# Patient Record
Sex: FEMALE | Race: BLACK OR AFRICAN AMERICAN | NOT HISPANIC OR LATINO | Employment: OTHER
[De-identification: names, ages, dates, MRNs, and addresses within clinical notes are randomized per-mention and may not be internally consistent; named-entity substitution may affect disease eponyms.]

---

## 2016-08-30 LAB — COLONOSCOPY STUDY: NORMAL

## 2017-02-07 ENCOUNTER — CHARTING TRANS (OUTPATIENT)
Dept: OTHER | Age: 62
End: 2017-02-07

## 2017-02-07 ASSESSMENT — PAIN SCALES - GENERAL: PAINLEVEL_OUTOF10: 6

## 2017-03-10 ENCOUNTER — LAB SERVICES (OUTPATIENT)
Dept: OTHER | Age: 62
End: 2017-03-10

## 2017-03-10 ENCOUNTER — CHARTING TRANS (OUTPATIENT)
Dept: OTHER | Age: 62
End: 2017-03-10

## 2017-03-10 ASSESSMENT — PAIN SCALES - GENERAL: PAINLEVEL_OUTOF10: 0

## 2017-03-11 LAB
HEMATOCRIT: 38.4
HEMOGLOBIN: 12.2
LYM#: 1
LYM%: 48.8
MCH: 28.1
MCHC: 31.8
MCV: 88.5
MPV: 9.4
MXD#: 0.1
MXD%: 7.3
NEUT#: 0.9
NEUT%: 43.9
PLATELET: 189
RBC: 4.34
RDW-CV: 13.2
RDW-SD: 42.2
WBC: 2

## 2017-03-18 ENCOUNTER — CHARTING TRANS (OUTPATIENT)
Dept: OTHER | Age: 62
End: 2017-03-18

## 2017-03-18 LAB
25(OH)D3+25(OH)D2 SERPL-MCNC: 63.2 NG/ML (ref 30–100)
CHOLEST SERPL-MCNC: 240 MG/DL
CHOLEST/HDLC SERPL: 2.9
FOLATE SERPL-MCNC: >24 NG/ML
HDLC SERPL-MCNC: 83 MG/DL
LDLC SERPL CALC-MCNC: 150 MG/DL
LENGTH OF FAST TIME PATIENT: ABNORMAL HRS
NONHDLC SERPL-MCNC: 157 MG/DL
T4 FREE SERPL-MCNC: 1 NG/DL (ref 0.8–1.5)
TRIGL SERPL-MCNC: 36 MG/DL
TSH SERPL-ACNC: 5.6 MCUNITS/ML (ref 0.35–5)
VIT B12 SERPL-MCNC: 562 PG/ML (ref 211–911)

## 2017-04-05 ENCOUNTER — CHARTING TRANS (OUTPATIENT)
Dept: OTHER | Age: 62
End: 2017-04-05

## 2017-04-05 ASSESSMENT — PAIN SCALES - GENERAL: PAINLEVEL_OUTOF10: 0

## 2017-07-14 ENCOUNTER — CHARTING TRANS (OUTPATIENT)
Dept: OTHER | Age: 62
End: 2017-07-14

## 2017-07-14 ASSESSMENT — PAIN SCALES - GENERAL: PAINLEVEL_OUTOF10: 0

## 2017-08-01 ENCOUNTER — LAB SERVICES (OUTPATIENT)
Dept: OTHER | Age: 62
End: 2017-08-01

## 2017-08-03 LAB
HEMATOCRIT: 35
HEMOGLOBIN: 11.5
LYM#: 1
LYM%: 44.8
MCH: 28.5
MCHC: 32.9
MCV: 86.6
MPV: 9.8
MXD#: 0.1
MXD%: 5.8
NEUT#: 1.2
NEUT%: 49.4
PLATELET: 184
RBC: 4.04
RDW-CV: 13.3
RDW-SD: 41.7
WBC: 2.3

## 2017-08-06 ENCOUNTER — CHARTING TRANS (OUTPATIENT)
Dept: OTHER | Age: 62
End: 2017-08-06

## 2017-08-06 LAB
ALBUMIN SERPL-MCNC: 3.6 G/DL (ref 3.6–5.1)
ALBUMIN/GLOB SERPL: 1.2 (ref 1–2.4)
ALP SERPL-CCNC: 82 UNITS/L (ref 45–117)
ALT SERPL-CCNC: 23 UNITS/L
ANION GAP SERPL CALC-SCNC: 11 MMOL/L (ref 10–20)
AST SERPL-CCNC: 17 UNITS/L
BILIRUB SERPL-MCNC: 0.4 MG/DL (ref 0.2–1)
BUN SERPL-MCNC: 20 MG/DL (ref 6–20)
BUN/CREAT SERPL: 31 (ref 7–25)
CALCIUM SERPL-MCNC: 9 MG/DL (ref 8.4–10.2)
CHLORIDE SERPL-SCNC: 105 MMOL/L (ref 98–107)
CHOLEST SERPL-MCNC: 203 MG/DL
CHOLEST/HDLC SERPL: 2.5
CO2 SERPL-SCNC: 28 MMOL/L (ref 21–32)
CREAT SERPL-MCNC: 0.65 MG/DL (ref 0.51–0.95)
FOLATE SERPL-MCNC: >24 NG/ML
GLOBULIN SER-MCNC: 3.1 G/DL (ref 2–4)
GLUCOSE SERPL-MCNC: 81 MG/DL (ref 65–99)
HDLC SERPL-MCNC: 81 MG/DL
LDLC SERPL CALC-MCNC: 114 MG/DL
LENGTH OF FAST TIME PATIENT: ABNORMAL HRS
LENGTH OF FAST TIME PATIENT: ABNORMAL HRS
NONHDLC SERPL-MCNC: 122 MG/DL
POTASSIUM SERPL-SCNC: 4 MMOL/L (ref 3.4–5.1)
SODIUM SERPL-SCNC: 140 MMOL/L (ref 135–145)
T4 FREE SERPL-MCNC: 0.9 NG/DL (ref 0.8–1.5)
TOTAL PROTEIN: 6.7 G/DL (ref 6.4–8.2)
TRIGL SERPL-MCNC: 39 MG/DL
TSH SERPL-ACNC: 4.23 MCUNITS/ML (ref 0.35–5)
VIT B12 SERPL-MCNC: 604 PG/ML (ref 211–911)

## 2017-08-25 ENCOUNTER — CHARTING TRANS (OUTPATIENT)
Dept: OTHER | Age: 62
End: 2017-08-25

## 2017-08-25 ENCOUNTER — LAB SERVICES (OUTPATIENT)
Dept: OTHER | Age: 62
End: 2017-08-25

## 2017-08-25 ENCOUNTER — HOSPITAL (OUTPATIENT)
Dept: OTHER | Age: 62
End: 2017-08-25
Attending: INTERNAL MEDICINE

## 2017-08-25 ENCOUNTER — IMAGING SERVICES (OUTPATIENT)
Dept: OTHER | Age: 62
End: 2017-08-25

## 2017-08-25 ASSESSMENT — PAIN SCALES - GENERAL: PAINLEVEL_OUTOF10: 0

## 2017-08-30 ENCOUNTER — CHARTING TRANS (OUTPATIENT)
Dept: OTHER | Age: 62
End: 2017-08-30

## 2017-08-30 LAB — D-DIMER, QUANTITATIVE: 0.24 MCG/ML FEU

## 2017-08-30 ASSESSMENT — PAIN SCALES - GENERAL: PAINLEVEL_OUTOF10: 0

## 2017-10-16 ENCOUNTER — LAB SERVICES (OUTPATIENT)
Dept: OTHER | Age: 62
End: 2017-10-16

## 2017-10-16 ENCOUNTER — CHARTING TRANS (OUTPATIENT)
Dept: OTHER | Age: 62
End: 2017-10-16

## 2017-10-16 LAB
HEMATOCRIT: 38.1
HEMOGLOBIN: 12
LYM#: 1
LYM%: 39.8
MCH: 28.6
MCHC: 31.5
MCV: 90.9
MPV: 9.5
MXD#: 0.1
MXD%: 6.1
NEUT#: 1.3
NEUT%: 54.1
PLATELET: 203
RBC: 4.19
RDW-CV: 13.2
RDW-SD: 43.5
WBC: 2.4

## 2017-10-16 ASSESSMENT — PAIN SCALES - GENERAL: PAINLEVEL_OUTOF10: 0

## 2017-10-17 LAB
BASOPHILS # BLD: 0 K/MCL (ref 0–0.3)
BASOPHILS NFR BLD: 0 %
D DIMER PPP FEU-MCNC: 0.38 MG/L (FEU)
DIFFERENTIAL METHOD BLD: ABNORMAL
EOSINOPHIL # BLD: 0 K/MCL (ref 0.1–0.5)
EOSINOPHIL NFR BLD: 0 %
ERYTHROCYTE [DISTWIDTH] IN BLOOD: 13.6 % (ref 11–15)
HEMATOCRIT: 36.9 % (ref 36–46.5)
HEMOGLOBIN: 11.7 G/DL (ref 12–15.5)
LYMPHOCYTES # BLD: 1 K/MCL (ref 1–4)
LYMPHOCYTES NFR BLD: 40 %
MEAN CORPUSCULAR HEMOGLOBIN: 28.3 PG (ref 26–34)
MEAN CORPUSCULAR HGB CONC: 31.7 G/DL (ref 32–36.5)
MEAN CORPUSCULAR VOLUME: 89.3 FL (ref 78–100)
MONOCYTES # BLD: 0.2 K/MCL (ref 0.3–0.9)
MONOCYTES NFR BLD: 9 %
NEUTROPHILS # BLD: 1.3 K/MCL (ref 1.8–7.7)
NEUTROPHILS NFR BLD: 51 %
PLATELET COUNT: 211 K/MCL (ref 140–450)
RED CELL COUNT: 4.13 MIL/MCL (ref 4–5.2)
WHITE BLOOD COUNT: 2.5 K/MCL (ref 4.2–11)

## 2017-10-19 LAB
B BURGDOR IGG PATRN SER IB-IMP: NORMAL
B BURGDOR IGG SER QL IB: NEGATIVE
B BURGDOR IGG+IGM SER IB-IMP: NORMAL
B BURGDOR IGM PATRN SER IB-IMP: NORMAL
B BURGDOR IGM SER QL IB: NEGATIVE

## 2017-10-31 ENCOUNTER — CHARTING TRANS (OUTPATIENT)
Dept: OTHER | Age: 62
End: 2017-10-31

## 2017-11-01 ENCOUNTER — CHARTING TRANS (OUTPATIENT)
Dept: OTHER | Age: 62
End: 2017-11-01

## 2018-02-19 ENCOUNTER — LAB SERVICES (OUTPATIENT)
Dept: OTHER | Age: 63
End: 2018-02-19

## 2018-02-19 ENCOUNTER — CHARTING TRANS (OUTPATIENT)
Dept: OTHER | Age: 63
End: 2018-02-19

## 2018-02-19 ASSESSMENT — PAIN SCALES - GENERAL: PAINLEVEL_OUTOF10: 0

## 2018-02-20 LAB
HEMATOCRIT: 40
HEMOGLOBIN: 12.5
LYM#: 1
LYM%: 50.5
MCH: 28.4
MCHC: 31.3
MCV: 90.9
MPV: 9
MXD#: 0.2
MXD%: 9.6
NEUT#: 0.8
NEUT%: 39.9
PLATELET: 197
RBC: 4.4
RDW-CV: 13.1
RDW-SD: 42.9
WBC: 2

## 2018-02-26 ENCOUNTER — CHARTING TRANS (OUTPATIENT)
Dept: OTHER | Age: 63
End: 2018-02-26

## 2018-02-26 LAB
25(OH)D3+25(OH)D2 SERPL-MCNC: 59.2 NG/ML (ref 30–100)
HBV SURFACE AB SER QL: POSITIVE
HBV SURFACE AG SER QL: NEGATIVE
HCV AB SER QL: NEGATIVE
T4 FREE SERPL-MCNC: 1.1 NG/DL (ref 0.8–1.5)
TSH SERPL-ACNC: 1.74 MCUNITS/ML (ref 0.35–5)

## 2018-03-15 ENCOUNTER — CHARTING TRANS (OUTPATIENT)
Dept: OTHER | Age: 63
End: 2018-03-15

## 2018-03-15 ASSESSMENT — PAIN SCALES - GENERAL: PAINLEVEL_OUTOF10: 0

## 2018-05-14 ENCOUNTER — IMAGING SERVICES (OUTPATIENT)
Dept: OTHER | Age: 63
End: 2018-05-14

## 2018-05-14 ENCOUNTER — CHARTING TRANS (OUTPATIENT)
Dept: OTHER | Age: 63
End: 2018-05-14

## 2018-05-14 ENCOUNTER — LAB SERVICES (OUTPATIENT)
Dept: OTHER | Age: 63
End: 2018-05-14

## 2018-05-14 ASSESSMENT — PAIN SCALES - GENERAL: PAINLEVEL_OUTOF10: 0

## 2018-05-15 LAB
HEMATOCRIT: 38.5
HEMOGLOBIN: 12.6
LYM#: 1.1
LYM%: 41.4
MCH: 28.6
MCHC: 32.7
MCV: 87.5
MPV: 9.7
MXD#: 0.2
MXD%: 8.2
NEUT#: 1.4
NEUT%: 50.4
PLATELET: 196
RBC: 4.4
RDW-CV: 13.3
RDW-SD: 42.6
WBC: 2.7

## 2018-08-22 ENCOUNTER — LAB SERVICES (OUTPATIENT)
Dept: OTHER | Age: 63
End: 2018-08-22

## 2018-08-22 ENCOUNTER — CHARTING TRANS (OUTPATIENT)
Dept: OTHER | Age: 63
End: 2018-08-22

## 2018-08-22 LAB
HEMATOCRIT: 36.7
HEMOGLOBIN: 12
LYM#: 1
LYM%: 44.7
MCH: 28.4
MCHC: 32.7
MCV: 87
MPV: 9.1
MXD#: 0.2
MXD%: 10
NEUT#: 1
NEUT%: 45.3
PLATELET: 209
RBC: 4.22
RDW-CV: 13.8
RDW-SD: 43.5
WBC: - 2.2

## 2018-08-30 ENCOUNTER — LAB SERVICES (OUTPATIENT)
Dept: OTHER | Age: 63
End: 2018-08-30

## 2018-09-03 ENCOUNTER — CHARTING TRANS (OUTPATIENT)
Dept: OTHER | Age: 63
End: 2018-09-03

## 2018-09-03 LAB
25(OH)D3+25(OH)D2 SERPL-MCNC: 39.1 NG/ML (ref 30–100)
ALBUMIN SERPL-MCNC: 3.6 G/DL (ref 3.6–5.1)
ALBUMIN/GLOB SERPL: 1.2 (ref 1–2.4)
ALP SERPL-CCNC: 97 UNITS/L (ref 45–117)
ALT SERPL-CCNC: 20 UNITS/L
ANION GAP SERPL CALC-SCNC: 14 MMOL/L (ref 10–20)
APPEARANCE UR: ABNORMAL
AST SERPL-CCNC: 16 UNITS/L
BACTERIA #/AREA URNS HPF: ABNORMAL /HPF
BILIRUB SERPL-MCNC: 0.3 MG/DL (ref 0.2–1)
BILIRUB UR QL: NEGATIVE
BUN SERPL-MCNC: 27 MG/DL (ref 6–20)
BUN/CREAT SERPL: 40 (ref 7–25)
CALCIUM SERPL-MCNC: 9.4 MG/DL (ref 8.4–10.2)
CHLORIDE SERPL-SCNC: 108 MMOL/L (ref 98–107)
CHOLEST SERPL-MCNC: 204 MG/DL
CHOLEST/HDLC SERPL: 2.8
CO2 SERPL-SCNC: 25 MMOL/L (ref 21–32)
COLOR UR: ABNORMAL
CREAT SERPL-MCNC: 0.68 MG/DL (ref 0.51–0.95)
GLOBULIN SER-MCNC: 3 G/DL (ref 2–4)
GLUCOSE SERPL-MCNC: 105 MG/DL (ref 65–99)
GLUCOSE UR-MCNC: NEGATIVE MG/DL
HDLC SERPL-MCNC: 72 MG/DL
HYALINE CASTS #/AREA URNS LPF: ABNORMAL /LPF (ref 0–5)
KETONES UR-MCNC: NEGATIVE MG/DL
LDLC SERPL CALC-MCNC: 123 MG/DL
LENGTH OF FAST TIME PATIENT: 12 HRS
LENGTH OF FAST TIME PATIENT: 12 HRS
MUCOUS THREADS URNS QL MICRO: PRESENT
NITRITE UR QL: NEGATIVE
NONHDLC SERPL-MCNC: 132 MG/DL
PH UR: 5 UNITS (ref 5–7)
POTASSIUM SERPL-SCNC: 4.3 MMOL/L (ref 3.4–5.1)
PROT UR QL: NEGATIVE MG/DL
RBC #/AREA URNS HPF: ABNORMAL /HPF (ref 0–3)
RBC-URINE: NEGATIVE
SODIUM SERPL-SCNC: 143 MMOL/L (ref 135–145)
SP GR UR: 1.02 (ref 1–1.03)
SPECIMEN SOURCE: ABNORMAL
SQUAMOUS #/AREA URNS HPF: ABNORMAL /HPF (ref 0–5)
T4 FREE SERPL-MCNC: 1 NG/DL (ref 0.8–1.5)
TOTAL PROTEIN: 6.6 G/DL (ref 6.4–8.2)
TRIGL SERPL-MCNC: 47 MG/DL
TSH SERPL-ACNC: 1.11 MCUNITS/ML (ref 0.35–5)
UROBILINOGEN UR QL: 0.2 MG/DL (ref 0–1)
WBC #/AREA URNS HPF: ABNORMAL /HPF (ref 0–5)
WBC-URINE: NEGATIVE

## 2018-09-17 ENCOUNTER — CHARTING TRANS (OUTPATIENT)
Dept: OTHER | Age: 63
End: 2018-09-17

## 2018-09-25 ENCOUNTER — HOSPITAL (OUTPATIENT)
Dept: OTHER | Age: 63
End: 2018-09-25

## 2018-09-25 ENCOUNTER — IMAGING SERVICES (OUTPATIENT)
Dept: OTHER | Age: 63
End: 2018-09-25

## 2018-09-30 ENCOUNTER — CHARTING TRANS (OUTPATIENT)
Dept: OTHER | Age: 63
End: 2018-09-30

## 2018-11-01 VITALS
BODY MASS INDEX: 28.41 KG/M2 | OXYGEN SATURATION: 98 % | DIASTOLIC BLOOD PRESSURE: 74 MMHG | SYSTOLIC BLOOD PRESSURE: 109 MMHG | HEIGHT: 67 IN | RESPIRATION RATE: 16 BRPM | WEIGHT: 181 LBS | TEMPERATURE: 98.2 F | HEART RATE: 67 BPM

## 2018-11-01 VITALS
RESPIRATION RATE: 16 BRPM | HEART RATE: 71 BPM | HEIGHT: 68 IN | TEMPERATURE: 98.4 F | SYSTOLIC BLOOD PRESSURE: 98 MMHG | BODY MASS INDEX: 28.19 KG/M2 | WEIGHT: 186 LBS | DIASTOLIC BLOOD PRESSURE: 68 MMHG | OXYGEN SATURATION: 100 %

## 2018-11-01 VITALS
BODY MASS INDEX: 29.58 KG/M2 | HEIGHT: 67 IN | RESPIRATION RATE: 17 BRPM | SYSTOLIC BLOOD PRESSURE: 106 MMHG | DIASTOLIC BLOOD PRESSURE: 70 MMHG | HEART RATE: 78 BPM | WEIGHT: 188.47 LBS | TEMPERATURE: 97.4 F

## 2018-11-01 VITALS — HEIGHT: 67 IN | BODY MASS INDEX: 29.51 KG/M2 | WEIGHT: 188 LBS

## 2018-11-02 VITALS
HEART RATE: 59 BPM | OXYGEN SATURATION: 100 % | SYSTOLIC BLOOD PRESSURE: 113 MMHG | WEIGHT: 183 LBS | TEMPERATURE: 97.4 F | DIASTOLIC BLOOD PRESSURE: 71 MMHG | RESPIRATION RATE: 16 BRPM | BODY MASS INDEX: 28.72 KG/M2 | HEIGHT: 67 IN

## 2018-11-02 VITALS — HEIGHT: 67 IN | TEMPERATURE: 97.3 F | WEIGHT: 183.21 LBS | BODY MASS INDEX: 28.75 KG/M2

## 2018-11-02 VITALS
WEIGHT: 186.29 LBS | HEART RATE: 65 BPM | HEIGHT: 67 IN | SYSTOLIC BLOOD PRESSURE: 104 MMHG | DIASTOLIC BLOOD PRESSURE: 70 MMHG | BODY MASS INDEX: 29.24 KG/M2

## 2018-11-03 VITALS
HEIGHT: 67 IN | SYSTOLIC BLOOD PRESSURE: 110 MMHG | BODY MASS INDEX: 28.48 KG/M2 | WEIGHT: 181.44 LBS | WEIGHT: 181 LBS | RESPIRATION RATE: 20 BRPM | HEIGHT: 67 IN | HEART RATE: 63 BPM | BODY MASS INDEX: 28.41 KG/M2 | DIASTOLIC BLOOD PRESSURE: 76 MMHG | OXYGEN SATURATION: 100 % | TEMPERATURE: 97.6 F

## 2018-11-03 VITALS
BODY MASS INDEX: 28.25 KG/M2 | HEART RATE: 55 BPM | SYSTOLIC BLOOD PRESSURE: 110 MMHG | TEMPERATURE: 97.5 F | WEIGHT: 180 LBS | RESPIRATION RATE: 20 BRPM | DIASTOLIC BLOOD PRESSURE: 75 MMHG | HEIGHT: 67 IN | OXYGEN SATURATION: 99 %

## 2018-11-03 VITALS
TEMPERATURE: 98.2 F | SYSTOLIC BLOOD PRESSURE: 95 MMHG | WEIGHT: 179.66 LBS | BODY MASS INDEX: 28.2 KG/M2 | HEART RATE: 65 BPM | DIASTOLIC BLOOD PRESSURE: 69 MMHG | HEIGHT: 67 IN | RESPIRATION RATE: 18 BRPM

## 2018-11-05 VITALS
TEMPERATURE: 97.7 F | DIASTOLIC BLOOD PRESSURE: 72 MMHG | RESPIRATION RATE: 20 BRPM | OXYGEN SATURATION: 99 % | WEIGHT: 176 LBS | SYSTOLIC BLOOD PRESSURE: 106 MMHG | BODY MASS INDEX: 27.62 KG/M2 | HEIGHT: 67 IN | HEART RATE: 61 BPM

## 2018-11-05 VITALS
DIASTOLIC BLOOD PRESSURE: 69 MMHG | BODY MASS INDEX: 27.85 KG/M2 | WEIGHT: 177.47 LBS | HEART RATE: 68 BPM | RESPIRATION RATE: 18 BRPM | TEMPERATURE: 97.9 F | SYSTOLIC BLOOD PRESSURE: 103 MMHG | HEIGHT: 67 IN

## 2018-11-05 VITALS
HEIGHT: 67 IN | RESPIRATION RATE: 20 BRPM | WEIGHT: 177.58 LBS | TEMPERATURE: 97.7 F | DIASTOLIC BLOOD PRESSURE: 72 MMHG | BODY MASS INDEX: 27.87 KG/M2 | HEART RATE: 59 BPM | SYSTOLIC BLOOD PRESSURE: 109 MMHG

## 2018-11-27 VITALS
BODY MASS INDEX: 28.09 KG/M2 | SYSTOLIC BLOOD PRESSURE: 108 MMHG | HEART RATE: 72 BPM | HEIGHT: 67 IN | DIASTOLIC BLOOD PRESSURE: 69 MMHG | TEMPERATURE: 97.8 F | OXYGEN SATURATION: 99 % | WEIGHT: 179 LBS | RESPIRATION RATE: 18 BRPM

## 2018-11-27 VITALS
HEART RATE: 78 BPM | WEIGHT: 185.96 LBS | RESPIRATION RATE: 18 BRPM | TEMPERATURE: 98.2 F | HEIGHT: 67 IN | BODY MASS INDEX: 29.19 KG/M2

## 2018-12-04 ENCOUNTER — TELEPHONE (OUTPATIENT)
Dept: SCHEDULING | Age: 63
End: 2018-12-04

## 2018-12-05 ENCOUNTER — TELEPHONE (OUTPATIENT)
Dept: HEMATOLOGY/ONCOLOGY | Age: 63
End: 2018-12-05

## 2018-12-06 RX ORDER — LEVOTHYROXINE SODIUM 0.1 MG/1
TABLET ORAL
COMMUNITY
End: 2019-02-06 | Stop reason: SDUPTHER

## 2018-12-06 RX ORDER — ALENDRONATE SODIUM 70 MG/1
TABLET ORAL
COMMUNITY
Start: 2018-09-30 | End: 2019-02-06 | Stop reason: SDUPTHER

## 2018-12-24 ENCOUNTER — TELEPHONE (OUTPATIENT)
Dept: SCHEDULING | Age: 63
End: 2018-12-24

## 2019-01-03 DIAGNOSIS — Z12.31 VISIT FOR SCREENING MAMMOGRAM: Primary | ICD-10-CM

## 2019-01-10 DIAGNOSIS — M85.80 OSTEOPENIA, UNSPECIFIED LOCATION: ICD-10-CM

## 2019-01-10 DIAGNOSIS — D70.9 NEUTROPENIA, UNSPECIFIED TYPE (CMD): ICD-10-CM

## 2019-01-10 DIAGNOSIS — E55.9 VITAMIN D DEFICIENCY: ICD-10-CM

## 2019-01-10 DIAGNOSIS — E78.5 HYPERLIPIDEMIA, UNSPECIFIED HYPERLIPIDEMIA TYPE: ICD-10-CM

## 2019-01-10 DIAGNOSIS — R73.01 IFG (IMPAIRED FASTING GLUCOSE): ICD-10-CM

## 2019-01-10 DIAGNOSIS — D46.9 MYELODYSPLASTIC SYNDROME (CMD): ICD-10-CM

## 2019-01-10 DIAGNOSIS — E03.9 HYPOTHYROIDISM, UNSPECIFIED TYPE: Primary | ICD-10-CM

## 2019-02-06 ENCOUNTER — TELEPHONE (OUTPATIENT)
Dept: SCHEDULING | Age: 64
End: 2019-02-06

## 2019-02-06 ENCOUNTER — APPOINTMENT (OUTPATIENT)
Dept: LAB | Age: 64
End: 2019-02-06

## 2019-02-06 ENCOUNTER — OFFICE VISIT (OUTPATIENT)
Dept: ENDOCRINOLOGY | Age: 64
End: 2019-02-06

## 2019-02-06 ENCOUNTER — OFFICE VISIT (OUTPATIENT)
Dept: HEMATOLOGY/ONCOLOGY | Age: 64
End: 2019-02-06

## 2019-02-06 VITALS
DIASTOLIC BLOOD PRESSURE: 66 MMHG | WEIGHT: 187.83 LBS | HEIGHT: 67 IN | HEART RATE: 71 BPM | SYSTOLIC BLOOD PRESSURE: 106 MMHG | BODY MASS INDEX: 29.48 KG/M2

## 2019-02-06 DIAGNOSIS — Z00.00 HEALTHCARE MAINTENANCE: ICD-10-CM

## 2019-02-06 DIAGNOSIS — F20.9 SCHIZOPHRENIA, UNSPECIFIED TYPE (CMD): ICD-10-CM

## 2019-02-06 DIAGNOSIS — D70.8 OTHER NEUTROPENIA (CMD): Primary | ICD-10-CM

## 2019-02-06 DIAGNOSIS — M81.0 AGE-RELATED OSTEOPOROSIS WITHOUT CURRENT PATHOLOGICAL FRACTURE: ICD-10-CM

## 2019-02-06 DIAGNOSIS — E03.9 ACQUIRED HYPOTHYROIDISM: Primary | ICD-10-CM

## 2019-02-06 PROCEDURE — 36415 COLL VENOUS BLD VENIPUNCTURE: CPT | Performed by: INTERNAL MEDICINE

## 2019-02-06 PROCEDURE — 85025 COMPLETE CBC W/AUTO DIFF WBC: CPT | Performed by: INTERNAL MEDICINE

## 2019-02-06 PROCEDURE — 99214 OFFICE O/P EST MOD 30 MIN: CPT | Performed by: INTERNAL MEDICINE

## 2019-02-06 PROCEDURE — 99213 OFFICE O/P EST LOW 20 MIN: CPT | Performed by: INTERNAL MEDICINE

## 2019-02-06 RX ORDER — LEVOTHYROXINE SODIUM 0.1 MG/1
100 TABLET ORAL DAILY
Qty: 90 TABLET | Refills: 3 | Status: SHIPPED | OUTPATIENT
Start: 2019-02-06 | End: 2019-08-06 | Stop reason: SDUPTHER

## 2019-02-06 RX ORDER — ALENDRONATE SODIUM 70 MG/1
70 TABLET ORAL
Qty: 90 TABLET | Refills: 3 | Status: SHIPPED | OUTPATIENT
Start: 2019-02-06 | End: 2022-03-16 | Stop reason: SDUPTHER

## 2019-02-06 RX ORDER — RISPERIDONE 2 MG/1
2 TABLET ORAL AT BEDTIME
COMMUNITY
End: 2022-07-12 | Stop reason: DRUGHIGH

## 2019-02-06 ASSESSMENT — ENCOUNTER SYMPTOMS
GASTROINTESTINAL NEGATIVE: 1
CONSTITUTIONAL NEGATIVE: 1
EYES NEGATIVE: 1
NEUROLOGICAL NEGATIVE: 1
RESPIRATORY NEGATIVE: 1
PSYCHIATRIC NEGATIVE: 1

## 2019-02-06 ASSESSMENT — PAIN SCALES - GENERAL: PAINLEVEL: 0

## 2019-03-12 ENCOUNTER — APPOINTMENT (OUTPATIENT)
Dept: LAB | Age: 64
End: 2019-03-12

## 2019-03-12 ENCOUNTER — OFFICE VISIT (OUTPATIENT)
Dept: DERMATOLOGY | Age: 64
End: 2019-03-12

## 2019-03-12 VITALS — RESPIRATION RATE: 18 BRPM | HEIGHT: 67 IN | WEIGHT: 185.85 LBS | BODY MASS INDEX: 29.17 KG/M2

## 2019-03-12 DIAGNOSIS — L82.1 SEBORRHEIC KERATOSIS: ICD-10-CM

## 2019-03-12 DIAGNOSIS — E03.9 ACQUIRED HYPOTHYROIDISM: ICD-10-CM

## 2019-03-12 DIAGNOSIS — F20.9 SCHIZOPHRENIA, UNSPECIFIED TYPE (CMD): ICD-10-CM

## 2019-03-12 DIAGNOSIS — L85.3 XEROSIS OF SKIN: Primary | ICD-10-CM

## 2019-03-12 DIAGNOSIS — M81.0 AGE-RELATED OSTEOPOROSIS WITHOUT CURRENT PATHOLOGICAL FRACTURE: ICD-10-CM

## 2019-03-12 PROCEDURE — 99213 OFFICE O/P EST LOW 20 MIN: CPT | Performed by: PHYSICIAN ASSISTANT

## 2019-03-12 ASSESSMENT — PATIENT HEALTH QUESTIONNAIRE - PHQ9
SUM OF ALL RESPONSES TO PHQ9 QUESTIONS 1 AND 2: 0
2. FEELING DOWN, DEPRESSED OR HOPELESS: NOT AT ALL
1. LITTLE INTEREST OR PLEASURE IN DOING THINGS: NOT AT ALL
SUM OF ALL RESPONSES TO PHQ9 QUESTIONS 1 AND 2: 0

## 2019-03-12 ASSESSMENT — ENCOUNTER SYMPTOMS
ENDOCRINE COMMENTS: THYROID PROBLEM
CONSTITUTIONAL NEGATIVE: 1

## 2019-03-17 ENCOUNTER — TELEPHONE (OUTPATIENT)
Dept: SCHEDULING | Age: 64
End: 2019-03-17

## 2019-03-20 ENCOUNTER — OFFICE VISIT (OUTPATIENT)
Dept: INTERNAL MEDICINE | Age: 64
End: 2019-03-20

## 2019-03-20 DIAGNOSIS — F20.3 UNDIFFERENTIATED SCHIZOPHRENIA (CMD): Primary | ICD-10-CM

## 2019-03-20 DIAGNOSIS — E03.9 ACQUIRED HYPOTHYROIDISM: ICD-10-CM

## 2019-03-20 DIAGNOSIS — M81.0 AGE-RELATED OSTEOPOROSIS WITHOUT CURRENT PATHOLOGICAL FRACTURE: ICD-10-CM

## 2019-03-20 DIAGNOSIS — D70.8 OTHER NEUTROPENIA (CMD): ICD-10-CM

## 2019-03-20 PROCEDURE — 99214 OFFICE O/P EST MOD 30 MIN: CPT | Performed by: INTERNAL MEDICINE

## 2019-03-20 SDOH — HEALTH STABILITY: PHYSICAL HEALTH: ON AVERAGE, HOW MANY DAYS PER WEEK DO YOU ENGAGE IN MODERATE TO STRENUOUS EXERCISE (LIKE A BRISK WALK)?: 7 DAYS

## 2019-03-20 SDOH — HEALTH STABILITY: MENTAL HEALTH
STRESS IS WHEN SOMEONE FEELS TENSE, NERVOUS, ANXIOUS, OR CAN'T SLEEP AT NIGHT BECAUSE THEIR MIND IS TROUBLED. HOW STRESSED ARE YOU?: TO SOME EXTENT

## 2019-03-20 SDOH — HEALTH STABILITY: PHYSICAL HEALTH: ON AVERAGE, HOW MANY MINUTES DO YOU ENGAGE IN EXERCISE AT THIS LEVEL?: 30 MIN

## 2019-03-20 ASSESSMENT — PAIN SCALES - GENERAL: PAINLEVEL: 0

## 2019-05-17 ENCOUNTER — HOSPITAL (OUTPATIENT)
Dept: OTHER | Age: 64
End: 2019-05-17

## 2019-05-17 ENCOUNTER — APPOINTMENT (OUTPATIENT)
Dept: LAB | Age: 64
End: 2019-05-17

## 2019-05-17 ENCOUNTER — IMAGING SERVICES (OUTPATIENT)
Dept: MAMMOGRAPHY | Age: 64
End: 2019-05-17

## 2019-05-17 ENCOUNTER — OFFICE VISIT (OUTPATIENT)
Dept: HEMATOLOGY/ONCOLOGY | Age: 64
End: 2019-05-17

## 2019-05-17 DIAGNOSIS — D70.8 OTHER NEUTROPENIA (CMD): ICD-10-CM

## 2019-05-17 LAB
DEPRECATED RDW RBC AUTO: 42.1 FL
ERYTHROCYTE [DISTWIDTH] IN BLOOD BY AUTOMATED COUNT: 13.3 %
HCT VFR BLD AUTO: 38.6 %
HGB BLD-MCNC: 12.4 G/DL
LYMPHOCYTES # BLD: 33.2 10*3/UL
LYMPHOCYTES NFR BLD: 33.2 %
MCH RBC QN AUTO: 27.9 PG
MCHC RBC AUTO-ENTMCNC: 32.1 G/DL
MCV RBC AUTO: 86.9 FL
NEUTROPHILS # BLD: 1.9 10*3/UL
NEUTROPHILS NFR BLD: 61.2 %
PLATELET # BLD AUTO: 206 10*3/UL
PMV BLD AUTO: 9.5 FL
RBC # BLD AUTO: 4.44 10*6/UL
WBC # BLD AUTO: 3.1 10*3/UL
WBC OTHER # BLD: 0.2 10*3/UL
WBC OTHER NFR BLD: 5.6 %

## 2019-05-17 PROCEDURE — 77067 SCR MAMMO BI INCL CAD: CPT | Performed by: EMERGENCY MEDICINE

## 2019-05-17 PROCEDURE — 99213 OFFICE O/P EST LOW 20 MIN: CPT | Performed by: INTERNAL MEDICINE

## 2019-05-17 PROCEDURE — 85025 COMPLETE CBC W/AUTO DIFF WBC: CPT | Performed by: INTERNAL MEDICINE

## 2019-05-17 PROCEDURE — 77063 BREAST TOMOSYNTHESIS BI: CPT | Performed by: EMERGENCY MEDICINE

## 2019-05-17 ASSESSMENT — PAIN SCALES - GENERAL: PAINLEVEL: 0

## 2019-05-17 ASSESSMENT — ENCOUNTER SYMPTOMS
PSYCHIATRIC NEGATIVE: 1
EYES NEGATIVE: 1
GASTROINTESTINAL NEGATIVE: 1
NEUROLOGICAL NEGATIVE: 1
CONSTITUTIONAL NEGATIVE: 1
RESPIRATORY NEGATIVE: 1

## 2019-08-01 ENCOUNTER — TELEPHONE (OUTPATIENT)
Dept: INTERNAL MEDICINE | Age: 64
End: 2019-08-01

## 2019-08-01 ENCOUNTER — APPOINTMENT (OUTPATIENT)
Dept: ENDOCRINOLOGY | Age: 64
End: 2019-08-01

## 2019-08-06 RX ORDER — LEVOTHYROXINE SODIUM 0.1 MG/1
100 TABLET ORAL DAILY
Qty: 90 TABLET | Refills: 3 | Status: SHIPPED | OUTPATIENT
Start: 2019-08-06 | End: 2019-10-16 | Stop reason: SDUPTHER

## 2019-09-04 ENCOUNTER — LAB SERVICES (OUTPATIENT)
Dept: LAB | Age: 64
End: 2019-09-04

## 2019-09-04 DIAGNOSIS — D70.8 OTHER NEUTROPENIA (CMD): ICD-10-CM

## 2019-09-04 DIAGNOSIS — M81.0 AGE-RELATED OSTEOPOROSIS WITHOUT CURRENT PATHOLOGICAL FRACTURE: ICD-10-CM

## 2019-09-04 DIAGNOSIS — E03.9 ACQUIRED HYPOTHYROIDISM: ICD-10-CM

## 2019-09-04 DIAGNOSIS — F20.3 UNDIFFERENTIATED SCHIZOPHRENIA (CMD): ICD-10-CM

## 2019-09-04 LAB
ALBUMIN SERPL-MCNC: 3.8 G/DL (ref 3.6–5.1)
ALBUMIN/GLOB SERPL: 1.3 {RATIO} (ref 1–2.4)
ALP SERPL-CCNC: 78 UNITS/L (ref 45–117)
ALT SERPL-CCNC: 17 UNITS/L
ANION GAP SERPL CALC-SCNC: 12 MMOL/L (ref 10–20)
AST SERPL-CCNC: 21 UNITS/L
BASOPHILS # BLD AUTO: 0 K/MCL (ref 0–0.3)
BASOPHILS NFR BLD AUTO: 0 %
BILIRUB SERPL-MCNC: 0.4 MG/DL (ref 0.2–1)
BUN SERPL-MCNC: 18 MG/DL (ref 6–20)
BUN/CREAT SERPL: 28 (ref 7–25)
CALCIUM SERPL-MCNC: 9.2 MG/DL (ref 8.4–10.2)
CHLORIDE SERPL-SCNC: 107 MMOL/L (ref 98–107)
CHOLEST SERPL-MCNC: 206 MG/DL
CHOLEST/HDLC SERPL: 3 {RATIO}
CO2 SERPL-SCNC: 26 MMOL/L (ref 21–32)
CREAT SERPL-MCNC: 0.64 MG/DL (ref 0.51–0.95)
DIFFERENTIAL METHOD BLD: ABNORMAL
EOSINOPHIL # BLD AUTO: 0 K/MCL (ref 0.1–0.5)
EOSINOPHIL NFR SPEC: 0 %
ERYTHROCYTE [DISTWIDTH] IN BLOOD: 13 % (ref 11–15)
FASTING STATUS PATIENT QL REPORTED: ABNORMAL HRS
GLOBULIN SER-MCNC: 2.9 G/DL (ref 2–4)
GLUCOSE SERPL-MCNC: 92 MG/DL (ref 65–99)
HBA1C MFR BLD: 5.3 % (ref 4.5–5.6)
HCT VFR BLD CALC: 38.3 % (ref 36–46.5)
HDLC SERPL-MCNC: 69 MG/DL
HGB BLD-MCNC: 12 G/DL (ref 12–15.5)
IMM GRANULOCYTES # BLD AUTO: 0 K/MCL (ref 0–0.2)
IMM GRANULOCYTES NFR BLD: 0 %
LDLC SERPL-MCNC: 126 MG/DL
LENGTH OF FAST TIME PATIENT: ABNORMAL HRS
LYMPHOCYTES # BLD MANUAL: 1.1 K/MCL (ref 1–4)
LYMPHOCYTES NFR BLD MANUAL: 39 %
MCH RBC QN AUTO: 28.1 PG (ref 26–34)
MCHC RBC AUTO-ENTMCNC: 31.3 G/DL (ref 32–36.5)
MCV RBC AUTO: 89.7 FL (ref 78–100)
MONOCYTES # BLD MANUAL: 0.3 K/MCL (ref 0.3–0.9)
MONOCYTES NFR BLD MANUAL: 13 %
NEUTROPHILS # BLD: 1.3 K/MCL (ref 1.8–7.7)
NEUTROPHILS NFR BLD AUTO: 48 %
NONHDLC SERPL-MCNC: 137 MG/DL
NRBC BLD MANUAL-RTO: 0 /100 WBC
PLATELET # BLD: 189 K/MCL (ref 140–450)
POTASSIUM SERPL-SCNC: 4.1 MMOL/L (ref 3.4–5.1)
PROT SERPL-MCNC: 6.7 G/DL (ref 6.4–8.2)
RBC # BLD: 4.27 MIL/MCL (ref 4–5.2)
SODIUM SERPL-SCNC: 141 MMOL/L (ref 135–145)
T4 FREE SERPL-MCNC: 1.1 NG/DL (ref 0.8–1.5)
TRIGL SERPL-MCNC: 54 MG/DL
TSH SERPL-ACNC: 1.4 MCUNITS/ML (ref 0.35–5)
WBC # BLD: 2.7 K/MCL (ref 4.2–11)

## 2019-09-04 PROCEDURE — 80061 LIPID PANEL: CPT | Performed by: INTERNAL MEDICINE

## 2019-09-04 PROCEDURE — 80050 GENERAL HEALTH PANEL: CPT | Performed by: INTERNAL MEDICINE

## 2019-09-04 PROCEDURE — 83036 HEMOGLOBIN GLYCOSYLATED A1C: CPT | Performed by: INTERNAL MEDICINE

## 2019-09-04 PROCEDURE — 36415 COLL VENOUS BLD VENIPUNCTURE: CPT

## 2019-09-04 PROCEDURE — 84439 ASSAY OF FREE THYROXINE: CPT | Performed by: INTERNAL MEDICINE

## 2019-09-12 ENCOUNTER — OFFICE VISIT (OUTPATIENT)
Dept: INTERNAL MEDICINE | Age: 64
End: 2019-09-12

## 2019-09-12 DIAGNOSIS — D70.9 NEUTROPENIA, UNSPECIFIED TYPE (CMD): ICD-10-CM

## 2019-09-12 DIAGNOSIS — F20.9 SCHIZOPHRENIA, UNSPECIFIED TYPE (CMD): Primary | ICD-10-CM

## 2019-09-12 DIAGNOSIS — E78.49 OTHER HYPERLIPIDEMIA: ICD-10-CM

## 2019-09-12 DIAGNOSIS — E03.9 ACQUIRED HYPOTHYROIDISM: ICD-10-CM

## 2019-09-12 DIAGNOSIS — M81.0 AGE-RELATED OSTEOPOROSIS WITHOUT CURRENT PATHOLOGICAL FRACTURE: ICD-10-CM

## 2019-09-12 PROCEDURE — 99214 OFFICE O/P EST MOD 30 MIN: CPT | Performed by: INTERNAL MEDICINE

## 2019-09-12 PROCEDURE — 90686 IIV4 VACC NO PRSV 0.5 ML IM: CPT

## 2019-09-12 PROCEDURE — 90471 IMMUNIZATION ADMIN: CPT

## 2019-09-12 SDOH — HEALTH STABILITY: PHYSICAL HEALTH: ON AVERAGE, HOW MANY DAYS PER WEEK DO YOU ENGAGE IN MODERATE TO STRENUOUS EXERCISE (LIKE A BRISK WALK)?: 7 DAYS

## 2019-09-12 SDOH — HEALTH STABILITY: PHYSICAL HEALTH: ON AVERAGE, HOW MANY MINUTES DO YOU ENGAGE IN EXERCISE AT THIS LEVEL?: 30 MIN

## 2019-09-12 ASSESSMENT — PAIN SCALES - GENERAL: PAINLEVEL: 0

## 2019-09-29 ENCOUNTER — TELEPHONE (OUTPATIENT)
Dept: SCHEDULING | Age: 64
End: 2019-09-29

## 2019-09-30 ENCOUNTER — OFFICE VISIT (OUTPATIENT)
Dept: HEMATOLOGY/ONCOLOGY | Age: 64
End: 2019-09-30

## 2019-09-30 ENCOUNTER — LAB SERVICES (OUTPATIENT)
Dept: LAB | Age: 64
End: 2019-09-30

## 2019-09-30 ENCOUNTER — APPOINTMENT (OUTPATIENT)
Dept: LAB | Age: 64
End: 2019-09-30

## 2019-09-30 DIAGNOSIS — M81.0 AGE-RELATED OSTEOPOROSIS WITHOUT CURRENT PATHOLOGICAL FRACTURE: ICD-10-CM

## 2019-09-30 DIAGNOSIS — E03.9 ACQUIRED HYPOTHYROIDISM: ICD-10-CM

## 2019-09-30 DIAGNOSIS — E78.49 OTHER HYPERLIPIDEMIA: ICD-10-CM

## 2019-09-30 DIAGNOSIS — D46.9 MDS (MYELODYSPLASTIC SYNDROME) (CMD): Primary | ICD-10-CM

## 2019-09-30 DIAGNOSIS — D70.9 NEUTROPENIA, UNSPECIFIED TYPE (CMD): ICD-10-CM

## 2019-09-30 DIAGNOSIS — D70.8 OTHER NEUTROPENIA (CMD): ICD-10-CM

## 2019-09-30 DIAGNOSIS — F20.9 SCHIZOPHRENIA, UNSPECIFIED TYPE (CMD): ICD-10-CM

## 2019-09-30 LAB
ALBUMIN SERPL-MCNC: 3.4 G/DL (ref 3.6–5.1)
ALBUMIN/GLOB SERPL: 1.2 {RATIO} (ref 1–2.4)
ALP SERPL-CCNC: 81 UNITS/L (ref 45–117)
ALT SERPL-CCNC: 17 UNITS/L
ANION GAP SERPL CALC-SCNC: 9 MMOL/L (ref 10–20)
APPEARANCE UR: CLEAR
AST SERPL-CCNC: 17 UNITS/L
BASOPHILS # BLD AUTO: 0 K/MCL (ref 0–0.3)
BASOPHILS NFR BLD AUTO: 0 %
BILIRUB SERPL-MCNC: 0.3 MG/DL (ref 0.2–1)
BILIRUB UR QL STRIP: NEGATIVE
BUN SERPL-MCNC: 22 MG/DL (ref 6–20)
BUN/CREAT SERPL: 38 (ref 7–25)
CALCIUM SERPL-MCNC: 8.8 MG/DL (ref 8.4–10.2)
CHLORIDE SERPL-SCNC: 113 MMOL/L (ref 98–107)
CO2 SERPL-SCNC: 24 MMOL/L (ref 21–32)
COLOR UR: YELLOW
CREAT SERPL-MCNC: 0.58 MG/DL (ref 0.51–0.95)
DEPRECATED RDW RBC AUTO: 41.7 FL
DIFFERENTIAL METHOD BLD: ABNORMAL
EOSINOPHIL # BLD AUTO: 0 K/MCL (ref 0.1–0.5)
EOSINOPHIL NFR SPEC: 0 %
ERYTHROCYTE [DISTWIDTH] IN BLOOD BY AUTOMATED COUNT: 13.2 %
ERYTHROCYTE [DISTWIDTH] IN BLOOD: 13 % (ref 11–15)
FASTING STATUS PATIENT QL REPORTED: ABNORMAL HRS
FOLATE SERPL-MCNC: 8.7 NG/ML
GLOBULIN SER-MCNC: 2.9 G/DL (ref 2–4)
GLUCOSE SERPL-MCNC: 94 MG/DL (ref 65–99)
GLUCOSE UR STRIP-MCNC: NEGATIVE MG/DL
HCT VFR BLD AUTO: 36.6 %
HCT VFR BLD CALC: 37.6 % (ref 36–46.5)
HGB BLD-MCNC: 11.4 G/DL (ref 12–15.5)
HGB BLD-MCNC: 11.9 G/DL
HGB UR QL STRIP: NEGATIVE
IMM GRANULOCYTES # BLD AUTO: 0 K/MCL (ref 0–0.2)
IMM GRANULOCYTES NFR BLD: 0 %
KETONES UR STRIP-MCNC: NEGATIVE MG/DL
LEUKOCYTE ESTERASE UR QL STRIP: NEGATIVE
LYMPHOCYTES # BLD MANUAL: 1 K/MCL (ref 1–4)
LYMPHOCYTES # BLD: 1 10*3/UL
LYMPHOCYTES NFR BLD MANUAL: 42 %
LYMPHOCYTES NFR BLD: 45.3 %
MCH RBC QN AUTO: 27.5 PG (ref 26–34)
MCH RBC QN AUTO: 28.3 PG
MCHC RBC AUTO-ENTMCNC: 30.3 G/DL (ref 32–36.5)
MCHC RBC AUTO-ENTMCNC: 32.5 G/DL
MCV RBC AUTO: 86.9 FL
MCV RBC AUTO: 90.8 FL (ref 78–100)
MONOCYTES # BLD MANUAL: 0.3 K/MCL (ref 0.3–0.9)
MONOCYTES NFR BLD MANUAL: 12 %
NEUTROPHILS # BLD: 1.1 10*3/UL
NEUTROPHILS # BLD: 1.2 K/MCL (ref 1.8–7.7)
NEUTROPHILS NFR BLD AUTO: 46 %
NEUTROPHILS NFR BLD: 46.7 %
NITRITE UR QL STRIP: NEGATIVE
NRBC BLD MANUAL-RTO: 0 /100 WBC
PH UR STRIP: 5 UNITS (ref 5–7)
PLATELET # BLD AUTO: 195 10*3/UL
PLATELET # BLD: 210 K/MCL (ref 140–450)
PMV BLD AUTO: 9.2 FL
POTASSIUM SERPL-SCNC: 4.2 MMOL/L (ref 3.4–5.1)
PROT SERPL-MCNC: 6.3 G/DL (ref 6.4–8.2)
PROT UR STRIP-MCNC: NEGATIVE MG/DL
RBC # BLD AUTO: 4.21 10*6/UL
RBC # BLD: 4.14 MIL/MCL (ref 4–5.2)
SODIUM SERPL-SCNC: 142 MMOL/L (ref 135–145)
SP GR UR STRIP: 1.02 (ref 1–1.03)
SPECIMEN SOURCE: NORMAL
T4 FREE SERPL-MCNC: 0.8 NG/DL (ref 0.8–1.5)
TSH SERPL-ACNC: 2.98 MCUNITS/ML (ref 0.35–5)
UROBILINOGEN UR STRIP-MCNC: 0.2 MG/DL (ref 0–1)
VIT B12 SERPL-MCNC: 467 PG/ML (ref 211–911)
WBC # BLD AUTO: 2.3 10*3/UL
WBC # BLD: 2.5 K/MCL (ref 4.2–11)
WBC OTHER # BLD: 0.2 10*3/UL
WBC OTHER NFR BLD: 8 %

## 2019-09-30 PROCEDURE — 99213 OFFICE O/P EST LOW 20 MIN: CPT | Performed by: INTERNAL MEDICINE

## 2019-09-30 PROCEDURE — 85025 COMPLETE CBC W/AUTO DIFF WBC: CPT | Performed by: PATHOLOGY

## 2019-09-30 PROCEDURE — 84439 ASSAY OF FREE THYROXINE: CPT | Performed by: INTERNAL MEDICINE

## 2019-09-30 PROCEDURE — 82746 ASSAY OF FOLIC ACID SERUM: CPT | Performed by: INTERNAL MEDICINE

## 2019-09-30 PROCEDURE — 36415 COLL VENOUS BLD VENIPUNCTURE: CPT

## 2019-09-30 PROCEDURE — 81003 URINALYSIS AUTO W/O SCOPE: CPT | Performed by: INTERNAL MEDICINE

## 2019-09-30 PROCEDURE — 80050 GENERAL HEALTH PANEL: CPT | Performed by: INTERNAL MEDICINE

## 2019-09-30 PROCEDURE — 82607 VITAMIN B-12: CPT | Performed by: INTERNAL MEDICINE

## 2019-09-30 ASSESSMENT — PATIENT HEALTH QUESTIONNAIRE - PHQ9
SUM OF ALL RESPONSES TO PHQ9 QUESTIONS 1 AND 2: 0
SUM OF ALL RESPONSES TO PHQ9 QUESTIONS 1 AND 2: 0
2. FEELING DOWN, DEPRESSED OR HOPELESS: NOT AT ALL
1. LITTLE INTEREST OR PLEASURE IN DOING THINGS: NOT AT ALL

## 2019-09-30 ASSESSMENT — PAIN SCALES - GENERAL: PAINLEVEL: 0

## 2019-10-16 ENCOUNTER — OFFICE VISIT (OUTPATIENT)
Dept: ENDOCRINOLOGY | Age: 64
End: 2019-10-16

## 2019-10-16 VITALS
BODY MASS INDEX: 29.13 KG/M2 | DIASTOLIC BLOOD PRESSURE: 65 MMHG | HEART RATE: 73 BPM | HEIGHT: 67 IN | WEIGHT: 185.6 LBS | SYSTOLIC BLOOD PRESSURE: 99 MMHG

## 2019-10-16 DIAGNOSIS — E03.9 ACQUIRED HYPOTHYROIDISM: Primary | ICD-10-CM

## 2019-10-16 DIAGNOSIS — M81.0 AGE-RELATED OSTEOPOROSIS WITHOUT CURRENT PATHOLOGICAL FRACTURE: ICD-10-CM

## 2019-10-16 DIAGNOSIS — D70.9 NEUTROPENIA, UNSPECIFIED TYPE (CMD): ICD-10-CM

## 2019-10-16 PROCEDURE — 99214 OFFICE O/P EST MOD 30 MIN: CPT | Performed by: INTERNAL MEDICINE

## 2019-10-16 RX ORDER — LEVOTHYROXINE SODIUM 0.1 MG/1
100 TABLET ORAL DAILY
Qty: 90 TABLET | Refills: 3 | Status: SHIPPED | OUTPATIENT
Start: 2019-10-16 | End: 2020-03-24 | Stop reason: SDUPTHER

## 2019-10-31 ENCOUNTER — TELEPHONE (OUTPATIENT)
Dept: SCHEDULING | Age: 64
End: 2019-10-31

## 2019-11-01 ENCOUNTER — LAB SERVICES (OUTPATIENT)
Dept: LAB | Age: 64
End: 2019-11-01

## 2019-11-01 ENCOUNTER — OFFICE VISIT (OUTPATIENT)
Dept: DERMATOLOGY | Age: 64
End: 2019-11-01

## 2019-11-01 ENCOUNTER — OFFICE VISIT (OUTPATIENT)
Dept: HEMATOLOGY/ONCOLOGY | Age: 64
End: 2019-11-01

## 2019-11-01 VITALS — WEIGHT: 186.2 LBS | RESPIRATION RATE: 18 BRPM | BODY MASS INDEX: 29.22 KG/M2 | HEIGHT: 67 IN

## 2019-11-01 DIAGNOSIS — D64.9 ANEMIA, UNSPECIFIED TYPE: ICD-10-CM

## 2019-11-01 DIAGNOSIS — E03.9 ACQUIRED HYPOTHYROIDISM: ICD-10-CM

## 2019-11-01 DIAGNOSIS — L85.3 XEROSIS OF SKIN: Primary | ICD-10-CM

## 2019-11-01 DIAGNOSIS — M81.0 AGE-RELATED OSTEOPOROSIS WITHOUT CURRENT PATHOLOGICAL FRACTURE: ICD-10-CM

## 2019-11-01 DIAGNOSIS — D46.9 MDS (MYELODYSPLASTIC SYNDROME) (CMD): ICD-10-CM

## 2019-11-01 DIAGNOSIS — L82.1 SEBORRHEIC KERATOSIS: ICD-10-CM

## 2019-11-01 DIAGNOSIS — D70.9 NEUTROPENIA, UNSPECIFIED TYPE (CMD): ICD-10-CM

## 2019-11-01 DIAGNOSIS — D64.9 ANEMIA, UNSPECIFIED TYPE: Primary | ICD-10-CM

## 2019-11-01 LAB
BASOPHILS # BLD AUTO: 0 K/MCL (ref 0–0.3)
BASOPHILS NFR BLD AUTO: 0 %
DIFFERENTIAL METHOD BLD: ABNORMAL
EOSINOPHIL # BLD AUTO: 0 K/MCL (ref 0.1–0.5)
EOSINOPHIL NFR SPEC: 0 %
ERYTHROCYTE [DISTWIDTH] IN BLOOD: 13.4 % (ref 11–15)
HCT VFR BLD CALC: 39.3 % (ref 36–46.5)
HGB BLD-MCNC: 12 G/DL (ref 12–15.5)
IMM GRANULOCYTES # BLD AUTO: 0 K/MCL (ref 0–0.2)
IMM GRANULOCYTES NFR BLD: 0 %
LYMPHOCYTES # BLD MANUAL: 1.1 K/MCL (ref 1–4)
LYMPHOCYTES NFR BLD MANUAL: 35 %
MCH RBC QN AUTO: 27.3 PG (ref 26–34)
MCHC RBC AUTO-ENTMCNC: 30.5 G/DL (ref 32–36.5)
MCV RBC AUTO: 89.5 FL (ref 78–100)
MONOCYTES # BLD MANUAL: 0.3 K/MCL (ref 0.3–0.9)
MONOCYTES NFR BLD MANUAL: 8 %
NEUTROPHILS # BLD: 1.9 K/MCL (ref 1.8–7.7)
NEUTROPHILS NFR BLD AUTO: 57 %
NRBC BLD MANUAL-RTO: 0 /100 WBC
PLATELET # BLD: 231 K/MCL (ref 140–450)
RBC # BLD: 4.39 MIL/MCL (ref 4–5.2)
WBC # BLD: 3.3 K/MCL (ref 4.2–11)

## 2019-11-01 PROCEDURE — 99213 OFFICE O/P EST LOW 20 MIN: CPT | Performed by: PHYSICIAN ASSISTANT

## 2019-11-01 PROCEDURE — 99213 OFFICE O/P EST LOW 20 MIN: CPT | Performed by: INTERNAL MEDICINE

## 2019-11-01 PROCEDURE — 85025 COMPLETE CBC W/AUTO DIFF WBC: CPT | Performed by: INTERNAL MEDICINE

## 2019-11-01 PROCEDURE — 36415 COLL VENOUS BLD VENIPUNCTURE: CPT

## 2019-11-01 ASSESSMENT — PATIENT HEALTH QUESTIONNAIRE - PHQ9
1. LITTLE INTEREST OR PLEASURE IN DOING THINGS: NOT AT ALL
SUM OF ALL RESPONSES TO PHQ9 QUESTIONS 1 AND 2: 0
2. FEELING DOWN, DEPRESSED OR HOPELESS: NOT AT ALL
SUM OF ALL RESPONSES TO PHQ9 QUESTIONS 1 AND 2: 0
1. LITTLE INTEREST OR PLEASURE IN DOING THINGS: NOT AT ALL
2. FEELING DOWN, DEPRESSED OR HOPELESS: NOT AT ALL
SUM OF ALL RESPONSES TO PHQ9 QUESTIONS 1 AND 2: 0
SUM OF ALL RESPONSES TO PHQ9 QUESTIONS 1 AND 2: 0

## 2019-11-01 ASSESSMENT — ENCOUNTER SYMPTOMS
EYES NEGATIVE: 1
PSYCHIATRIC NEGATIVE: 1
NEUROLOGICAL NEGATIVE: 1
GASTROINTESTINAL NEGATIVE: 1
ENDOCRINE NEGATIVE: 1
CONSTITUTIONAL NEGATIVE: 1
ALLERGIC/IMMUNOLOGIC NEGATIVE: 1
HEMATOLOGIC/LYMPHATIC NEGATIVE: 1
ENDOCRINE COMMENTS: THYROID PROBLEM
ROS SKIN COMMENTS: SEE HPI
RESPIRATORY NEGATIVE: 1
CONSTITUTIONAL NEGATIVE: 1

## 2019-11-01 ASSESSMENT — PAIN SCALES - GENERAL: PAINLEVEL: 0

## 2019-11-10 ENCOUNTER — TELEPHONE (OUTPATIENT)
Dept: SCHEDULING | Age: 64
End: 2019-11-10

## 2019-11-11 ENCOUNTER — OFFICE VISIT (OUTPATIENT)
Dept: INTERNAL MEDICINE | Age: 64
End: 2019-11-11

## 2019-11-11 DIAGNOSIS — M81.0 AGE-RELATED OSTEOPOROSIS WITHOUT CURRENT PATHOLOGICAL FRACTURE: ICD-10-CM

## 2019-11-11 DIAGNOSIS — F20.9 SCHIZOPHRENIA, UNSPECIFIED TYPE (CMD): ICD-10-CM

## 2019-11-11 DIAGNOSIS — E03.9 ACQUIRED HYPOTHYROIDISM: ICD-10-CM

## 2019-11-11 DIAGNOSIS — T07.XXXA MULTIPLE BRUISES: Primary | ICD-10-CM

## 2019-11-11 DIAGNOSIS — D70.9 NEUTROPENIA, UNSPECIFIED TYPE (CMD): ICD-10-CM

## 2019-11-11 PROCEDURE — 99214 OFFICE O/P EST MOD 30 MIN: CPT | Performed by: INTERNAL MEDICINE

## 2019-11-11 SDOH — HEALTH STABILITY: PHYSICAL HEALTH: ON AVERAGE, HOW MANY DAYS PER WEEK DO YOU ENGAGE IN MODERATE TO STRENUOUS EXERCISE (LIKE A BRISK WALK)?: 7 DAYS

## 2019-11-11 SDOH — HEALTH STABILITY: PHYSICAL HEALTH: ON AVERAGE, HOW MANY MINUTES DO YOU ENGAGE IN EXERCISE AT THIS LEVEL?: 30 MIN

## 2019-11-11 ASSESSMENT — ENCOUNTER SYMPTOMS
RHINORRHEA: 0
EYE ITCHING: 0
TREMORS: 0
NAUSEA: 0
DIZZINESS: 0
LIGHT-HEADEDNESS: 0
FACIAL SWELLING: 0
SINUS PAIN: 0
ABDOMINAL DISTENTION: 0
ACTIVITY CHANGE: 0
SORE THROAT: 0
NUMBNESS: 0
PHOTOPHOBIA: 0
ABDOMINAL PAIN: 0
EYE REDNESS: 0
BRUISES/BLEEDS EASILY: 1
EYE DISCHARGE: 0
SINUS PRESSURE: 0
FEVER: 0
APNEA: 0
DIAPHORESIS: 0
CHOKING: 0
CONSTIPATION: 0
POLYDIPSIA: 0
CHEST TIGHTNESS: 0
COLOR CHANGE: 0
HEADACHES: 0
APPETITE CHANGE: 0
DIARRHEA: 0
FATIGUE: 0
BACK PAIN: 0
STRIDOR: 0
COUGH: 0
SHORTNESS OF BREATH: 0
UNEXPECTED WEIGHT CHANGE: 0
WHEEZING: 0
CHILLS: 0
FACIAL ASYMMETRY: 0
EYE PAIN: 0
WOUND: 0

## 2019-11-11 ASSESSMENT — PAIN SCALES - GENERAL: PAINLEVEL: 0

## 2019-11-11 ASSESSMENT — PATIENT HEALTH QUESTIONNAIRE - PHQ9
1. LITTLE INTEREST OR PLEASURE IN DOING THINGS: NOT AT ALL
SUM OF ALL RESPONSES TO PHQ9 QUESTIONS 1 AND 2: 0
2. FEELING DOWN, DEPRESSED OR HOPELESS: NOT AT ALL
SUM OF ALL RESPONSES TO PHQ9 QUESTIONS 1 AND 2: 0

## 2019-11-18 ENCOUNTER — TELEPHONE (OUTPATIENT)
Dept: SCHEDULING | Age: 64
End: 2019-11-18

## 2019-12-16 ENCOUNTER — TELEPHONE (OUTPATIENT)
Dept: SCHEDULING | Age: 64
End: 2019-12-16

## 2019-12-17 ENCOUNTER — OFFICE VISIT (OUTPATIENT)
Dept: URGENT CARE | Age: 64
End: 2019-12-17

## 2019-12-17 ENCOUNTER — TELEPHONE (OUTPATIENT)
Dept: SCHEDULING | Age: 64
End: 2019-12-17

## 2019-12-17 VITALS
RESPIRATION RATE: 20 BRPM | OXYGEN SATURATION: 99 % | HEIGHT: 67 IN | DIASTOLIC BLOOD PRESSURE: 79 MMHG | SYSTOLIC BLOOD PRESSURE: 121 MMHG | BODY MASS INDEX: 28.88 KG/M2 | TEMPERATURE: 98.3 F | HEART RATE: 74 BPM | WEIGHT: 184 LBS

## 2019-12-17 DIAGNOSIS — J02.9 SORE THROAT: Primary | ICD-10-CM

## 2019-12-17 LAB
INTERNAL PROCEDURAL CONTROLS ACCEPTABLE: YES
S PYO AG THROAT QL IA.RAPID: POSITIVE

## 2019-12-17 PROCEDURE — 99214 OFFICE O/P EST MOD 30 MIN: CPT | Performed by: FAMILY MEDICINE

## 2019-12-17 PROCEDURE — 87880 STREP A ASSAY W/OPTIC: CPT | Performed by: FAMILY MEDICINE

## 2019-12-17 RX ORDER — CEPHALEXIN 500 MG/1
500 CAPSULE ORAL 3 TIMES DAILY
Qty: 21 CAPSULE | Refills: 0 | Status: SHIPPED | OUTPATIENT
Start: 2019-12-17 | End: 2019-12-18 | Stop reason: ALTCHOICE

## 2019-12-17 RX ORDER — CEPHALEXIN 500 MG/1
500 CAPSULE ORAL 3 TIMES DAILY
Qty: 21 CAPSULE | Refills: 0 | Status: SHIPPED | OUTPATIENT
Start: 2019-12-17 | End: 2019-12-17 | Stop reason: SDUPTHER

## 2019-12-18 ENCOUNTER — TELEPHONE (OUTPATIENT)
Dept: SCHEDULING | Age: 64
End: 2019-12-18

## 2019-12-18 ENCOUNTER — OFFICE VISIT (OUTPATIENT)
Dept: INTERNAL MEDICINE | Age: 64
End: 2019-12-18

## 2019-12-18 DIAGNOSIS — J02.0 ACUTE STREPTOCOCCAL PHARYNGITIS: Primary | ICD-10-CM

## 2019-12-18 PROCEDURE — 99213 OFFICE O/P EST LOW 20 MIN: CPT | Performed by: INTERNAL MEDICINE

## 2019-12-18 RX ORDER — PENICILLIN V POTASSIUM 500 MG/1
500 TABLET ORAL 4 TIMES DAILY
Qty: 40 TABLET | Refills: 0 | Status: SHIPPED | OUTPATIENT
Start: 2019-12-18 | End: 2019-12-28

## 2019-12-18 ASSESSMENT — PATIENT HEALTH QUESTIONNAIRE - PHQ9
2. FEELING DOWN, DEPRESSED OR HOPELESS: NOT AT ALL
1. LITTLE INTEREST OR PLEASURE IN DOING THINGS: NOT AT ALL
SUM OF ALL RESPONSES TO PHQ9 QUESTIONS 1 AND 2: 0
SUM OF ALL RESPONSES TO PHQ9 QUESTIONS 1 AND 2: 0

## 2019-12-18 ASSESSMENT — PAIN SCALES - GENERAL: PAINLEVEL: 0

## 2019-12-24 ENCOUNTER — TELEPHONE (OUTPATIENT)
Dept: SCHEDULING | Age: 64
End: 2019-12-24

## 2019-12-24 ENCOUNTER — TELEPHONE (OUTPATIENT)
Dept: INTERNAL MEDICINE | Age: 64
End: 2019-12-24

## 2019-12-28 ENCOUNTER — TELEPHONE (OUTPATIENT)
Dept: SCHEDULING | Age: 64
End: 2019-12-28

## 2019-12-30 ENCOUNTER — OFFICE VISIT (OUTPATIENT)
Dept: INTERNAL MEDICINE | Age: 64
End: 2019-12-30

## 2019-12-30 ENCOUNTER — TELEPHONE (OUTPATIENT)
Dept: SCHEDULING | Age: 64
End: 2019-12-30

## 2019-12-30 DIAGNOSIS — F20.9 SCHIZOPHRENIA, UNSPECIFIED TYPE (CMD): Primary | ICD-10-CM

## 2019-12-30 DIAGNOSIS — E66.3 OVERWEIGHT (BMI 25.0-29.9): ICD-10-CM

## 2019-12-30 DIAGNOSIS — E03.9 ACQUIRED HYPOTHYROIDISM: ICD-10-CM

## 2019-12-30 PROCEDURE — 99214 OFFICE O/P EST MOD 30 MIN: CPT | Performed by: GENERAL PRACTICE

## 2019-12-30 ASSESSMENT — PAIN SCALES - GENERAL: PAINLEVEL: 0

## 2019-12-30 ASSESSMENT — PATIENT HEALTH QUESTIONNAIRE - PHQ9
2. FEELING DOWN, DEPRESSED OR HOPELESS: NOT AT ALL
SUM OF ALL RESPONSES TO PHQ9 QUESTIONS 1 AND 2: 0
1. LITTLE INTEREST OR PLEASURE IN DOING THINGS: NOT AT ALL
SUM OF ALL RESPONSES TO PHQ9 QUESTIONS 1 AND 2: 0

## 2020-02-29 DIAGNOSIS — E03.9 ACQUIRED HYPOTHYROIDISM: ICD-10-CM

## 2020-02-29 DIAGNOSIS — M81.0 AGE-RELATED OSTEOPOROSIS WITHOUT CURRENT PATHOLOGICAL FRACTURE: Primary | ICD-10-CM

## 2020-02-29 DIAGNOSIS — D70.9 NEUTROPENIA, UNSPECIFIED TYPE (CMD): ICD-10-CM

## 2020-02-29 DIAGNOSIS — E78.49 OTHER HYPERLIPIDEMIA: ICD-10-CM

## 2020-03-16 ENCOUNTER — LAB SERVICES (OUTPATIENT)
Dept: LAB | Age: 65
End: 2020-03-16

## 2020-03-16 ENCOUNTER — APPOINTMENT (OUTPATIENT)
Dept: LAB | Age: 65
End: 2020-03-16

## 2020-03-16 DIAGNOSIS — E03.9 ACQUIRED HYPOTHYROIDISM: ICD-10-CM

## 2020-03-16 DIAGNOSIS — D70.9 NEUTROPENIA, UNSPECIFIED TYPE (CMD): ICD-10-CM

## 2020-03-16 DIAGNOSIS — E78.49 OTHER HYPERLIPIDEMIA: ICD-10-CM

## 2020-03-16 DIAGNOSIS — M81.0 AGE-RELATED OSTEOPOROSIS WITHOUT CURRENT PATHOLOGICAL FRACTURE: ICD-10-CM

## 2020-03-16 LAB
25(OH)D3+25(OH)D2 SERPL-MCNC: 46.8 NG/ML (ref 30–100)
ALBUMIN SERPL-MCNC: 3.6 G/DL (ref 3.6–5.1)
ALBUMIN/GLOB SERPL: 1.1 {RATIO} (ref 1–2.4)
ALP SERPL-CCNC: 73 UNITS/L (ref 45–117)
ALT SERPL-CCNC: 16 UNITS/L
ANION GAP SERPL CALC-SCNC: 11 MMOL/L (ref 10–20)
AST SERPL-CCNC: 14 UNITS/L
BASOPHILS # BLD: 0 K/MCL (ref 0–0.3)
BASOPHILS NFR BLD: 0 %
BILIRUB SERPL-MCNC: 0.4 MG/DL (ref 0.2–1)
BUN SERPL-MCNC: 22 MG/DL (ref 6–20)
BUN/CREAT SERPL: 33 (ref 7–25)
CALCIUM SERPL-MCNC: 9.2 MG/DL (ref 8.4–10.2)
CHLORIDE SERPL-SCNC: 107 MMOL/L (ref 98–107)
CO2 SERPL-SCNC: 27 MMOL/L (ref 21–32)
CREAT SERPL-MCNC: 0.68 MG/DL (ref 0.51–0.95)
DIFFERENTIAL METHOD BLD: ABNORMAL
EOSINOPHIL # BLD: 0 K/MCL (ref 0.1–0.5)
EOSINOPHIL NFR BLD: 0 %
ERYTHROCYTE [DISTWIDTH] IN BLOOD: 13.6 % (ref 11–15)
FOLATE SERPL-MCNC: >24 NG/ML
GLOBULIN SER-MCNC: 3.4 G/DL (ref 2–4)
GLUCOSE SERPL-MCNC: 100 MG/DL (ref 65–99)
HCT VFR BLD CALC: 40 % (ref 36–46.5)
HGB BLD-MCNC: 12.3 G/DL (ref 12–15.5)
IMM GRANULOCYTES # BLD AUTO: 0 K/MCL (ref 0–0.2)
IMM GRANULOCYTES NFR BLD: 0 %
LENGTH OF FAST TIME PATIENT: ABNORMAL HRS
LYMPHOCYTES # BLD: 1 K/MCL (ref 1–4)
LYMPHOCYTES NFR BLD: 40 %
MCH RBC QN AUTO: 27.5 PG (ref 26–34)
MCHC RBC AUTO-ENTMCNC: 30.8 G/DL (ref 32–36.5)
MCV RBC AUTO: 89.5 FL (ref 78–100)
MONOCYTES # BLD: 0.3 K/MCL (ref 0.3–0.9)
MONOCYTES NFR BLD: 10 %
NEUTROPHILS # BLD: 1.3 K/MCL (ref 1.8–7.7)
NEUTROPHILS NFR BLD: 50 %
NRBC BLD MANUAL-RTO: 0 /100 WBC
PLATELET # BLD: 226 K/MCL (ref 140–450)
POTASSIUM SERPL-SCNC: 4.8 MMOL/L (ref 3.4–5.1)
PROT SERPL-MCNC: 7 G/DL (ref 6.4–8.2)
RBC # BLD: 4.47 MIL/MCL (ref 4–5.2)
SODIUM SERPL-SCNC: 140 MMOL/L (ref 135–145)
TSH SERPL-ACNC: 2.66 MCUNITS/ML (ref 0.35–5)
VIT B12 SERPL-MCNC: 493 PG/ML (ref 211–911)
WBC # BLD: 2.5 K/MCL (ref 4.2–11)

## 2020-03-16 PROCEDURE — 82746 ASSAY OF FOLIC ACID SERUM: CPT | Performed by: INTERNAL MEDICINE

## 2020-03-16 PROCEDURE — 36415 COLL VENOUS BLD VENIPUNCTURE: CPT

## 2020-03-16 PROCEDURE — 82306 VITAMIN D 25 HYDROXY: CPT | Performed by: INTERNAL MEDICINE

## 2020-03-16 PROCEDURE — 80050 GENERAL HEALTH PANEL: CPT | Performed by: INTERNAL MEDICINE

## 2020-03-16 PROCEDURE — 82607 VITAMIN B-12: CPT | Performed by: INTERNAL MEDICINE

## 2020-03-24 ENCOUNTER — APPOINTMENT (OUTPATIENT)
Dept: INTERNAL MEDICINE | Age: 65
End: 2020-03-24

## 2020-03-24 ENCOUNTER — OFFICE VISIT (OUTPATIENT)
Dept: INTERNAL MEDICINE | Age: 65
End: 2020-03-24

## 2020-03-24 ENCOUNTER — APPOINTMENT (OUTPATIENT)
Dept: DERMATOLOGY | Age: 65
End: 2020-03-24

## 2020-03-24 DIAGNOSIS — D46.9 MYELODYSPLASTIC SYNDROME (CMD): Primary | ICD-10-CM

## 2020-03-24 DIAGNOSIS — E03.9 ACQUIRED HYPOTHYROIDISM: ICD-10-CM

## 2020-03-24 DIAGNOSIS — E66.3 OVERWEIGHT (BMI 25.0-29.9): ICD-10-CM

## 2020-03-24 DIAGNOSIS — F20.89 OTHER SCHIZOPHRENIA (CMD): ICD-10-CM

## 2020-03-24 DIAGNOSIS — R73.01 IFG (IMPAIRED FASTING GLUCOSE): ICD-10-CM

## 2020-03-24 DIAGNOSIS — D70.9 NEUTROPENIA, UNSPECIFIED TYPE (CMD): ICD-10-CM

## 2020-03-24 DIAGNOSIS — E78.49 OTHER HYPERLIPIDEMIA: ICD-10-CM

## 2020-03-24 DIAGNOSIS — M81.0 AGE-RELATED OSTEOPOROSIS WITHOUT CURRENT PATHOLOGICAL FRACTURE: ICD-10-CM

## 2020-03-24 PROCEDURE — 99214 OFFICE O/P EST MOD 30 MIN: CPT | Performed by: INTERNAL MEDICINE

## 2020-03-24 SDOH — HEALTH STABILITY: PHYSICAL HEALTH: ON AVERAGE, HOW MANY MINUTES DO YOU ENGAGE IN EXERCISE AT THIS LEVEL?: 30 MIN

## 2020-03-24 SDOH — HEALTH STABILITY: PHYSICAL HEALTH: ON AVERAGE, HOW MANY DAYS PER WEEK DO YOU ENGAGE IN MODERATE TO STRENUOUS EXERCISE (LIKE A BRISK WALK)?: 7 DAYS

## 2020-03-24 ASSESSMENT — PATIENT HEALTH QUESTIONNAIRE - PHQ9
SUM OF ALL RESPONSES TO PHQ9 QUESTIONS 1 AND 2: 0
2. FEELING DOWN, DEPRESSED OR HOPELESS: NOT AT ALL
SUM OF ALL RESPONSES TO PHQ9 QUESTIONS 1 AND 2: 0
1. LITTLE INTEREST OR PLEASURE IN DOING THINGS: NOT AT ALL

## 2020-03-24 ASSESSMENT — PAIN SCALES - GENERAL: PAINLEVEL: 0

## 2020-03-25 RX ORDER — LEVOTHYROXINE SODIUM 0.1 MG/1
100 TABLET ORAL DAILY
Qty: 90 TABLET | Refills: 3 | Status: SHIPPED | OUTPATIENT
Start: 2020-03-25 | End: 2021-07-11 | Stop reason: SDUPTHER

## 2020-03-27 ENCOUNTER — TELEPHONE (OUTPATIENT)
Dept: HEMATOLOGY/ONCOLOGY | Age: 65
End: 2020-03-27

## 2020-04-01 ENCOUNTER — OFFICE VISIT (OUTPATIENT)
Dept: HEMATOLOGY/ONCOLOGY | Age: 65
End: 2020-04-01

## 2020-04-01 DIAGNOSIS — D46.9 MDS (MYELODYSPLASTIC SYNDROME) (CMD): Primary | ICD-10-CM

## 2020-04-01 PROCEDURE — 99441 TELEPHONE E&M BY PHYSICIAN EST PT NOT ORIG PREV 7 DAYS 5-10 MIN: CPT | Performed by: INTERNAL MEDICINE

## 2020-04-01 ASSESSMENT — ENCOUNTER SYMPTOMS
GASTROINTESTINAL NEGATIVE: 1
EYES NEGATIVE: 1
NEUROLOGICAL NEGATIVE: 1
ALLERGIC/IMMUNOLOGIC NEGATIVE: 1
ENDOCRINE NEGATIVE: 1
CONSTITUTIONAL NEGATIVE: 1
HEMATOLOGIC/LYMPHATIC NEGATIVE: 1
RESPIRATORY NEGATIVE: 1
PSYCHIATRIC NEGATIVE: 1

## 2020-04-07 ENCOUNTER — TELEPHONE (OUTPATIENT)
Dept: SCHEDULING | Age: 65
End: 2020-04-07

## 2020-04-08 ENCOUNTER — TELEPHONE (OUTPATIENT)
Dept: SCHEDULING | Age: 65
End: 2020-04-08

## 2020-06-16 ENCOUNTER — IMAGING SERVICES (OUTPATIENT)
Dept: MAMMOGRAPHY | Age: 65
End: 2020-06-16

## 2020-06-16 PROCEDURE — 77067 SCR MAMMO BI INCL CAD: CPT | Performed by: EMERGENCY MEDICINE

## 2020-06-16 PROCEDURE — 77063 BREAST TOMOSYNTHESIS BI: CPT | Performed by: EMERGENCY MEDICINE

## 2020-08-24 ENCOUNTER — TELEPHONE (OUTPATIENT)
Dept: SCHEDULING | Age: 65
End: 2020-08-24

## 2020-08-31 ENCOUNTER — LAB SERVICES (OUTPATIENT)
Dept: LAB | Age: 65
End: 2020-08-31

## 2020-09-09 ENCOUNTER — TELEPHONE (OUTPATIENT)
Dept: SCHEDULING | Age: 65
End: 2020-09-09

## 2020-09-12 ENCOUNTER — TELEPHONE (OUTPATIENT)
Dept: SCHEDULING | Age: 65
End: 2020-09-12

## 2020-09-14 ENCOUNTER — TELEPHONE (OUTPATIENT)
Dept: SCHEDULING | Age: 65
End: 2020-09-14

## 2020-09-16 ENCOUNTER — OFFICE VISIT (OUTPATIENT)
Dept: HEMATOLOGY/ONCOLOGY | Age: 65
End: 2020-09-16

## 2020-09-16 DIAGNOSIS — D46.9 MYELODYSPLASTIC SYNDROME (CMD): Primary | ICD-10-CM

## 2020-09-16 DIAGNOSIS — D46.9 MDS (MYELODYSPLASTIC SYNDROME) (CMD): Primary | ICD-10-CM

## 2020-09-16 PROCEDURE — 99213 OFFICE O/P EST LOW 20 MIN: CPT | Performed by: INTERNAL MEDICINE

## 2020-09-16 PROCEDURE — 99214 OFFICE O/P EST MOD 30 MIN: CPT | Performed by: INTERNAL MEDICINE

## 2020-09-16 ASSESSMENT — PATIENT HEALTH QUESTIONNAIRE - PHQ9
2. FEELING DOWN, DEPRESSED OR HOPELESS: NOT AT ALL
1. LITTLE INTEREST OR PLEASURE IN DOING THINGS: NOT AT ALL
SUM OF ALL RESPONSES TO PHQ9 QUESTIONS 1 AND 2: 0
CLINICAL INTERPRETATION OF PHQ9 SCORE: NO FURTHER SCREENING NEEDED
CLINICAL INTERPRETATION OF PHQ2 SCORE: NO FURTHER SCREENING NEEDED
SUM OF ALL RESPONSES TO PHQ9 QUESTIONS 1 AND 2: 0
CLINICAL INTERPRETATION OF PHQ9 SCORE: NO FURTHER SCREENING NEEDED
2. FEELING DOWN, DEPRESSED OR HOPELESS: NOT AT ALL
SUM OF ALL RESPONSES TO PHQ9 QUESTIONS 1 AND 2: 0
SUM OF ALL RESPONSES TO PHQ9 QUESTIONS 1 AND 2: 0
CLINICAL INTERPRETATION OF PHQ2 SCORE: NO FURTHER SCREENING NEEDED
1. LITTLE INTEREST OR PLEASURE IN DOING THINGS: NOT AT ALL

## 2020-09-16 ASSESSMENT — ENCOUNTER SYMPTOMS
ENDOCRINE NEGATIVE: 1
CONSTITUTIONAL NEGATIVE: 1
EYES NEGATIVE: 1
NEUROLOGICAL NEGATIVE: 1
ALLERGIC/IMMUNOLOGIC NEGATIVE: 1
GASTROINTESTINAL NEGATIVE: 1
PSYCHIATRIC NEGATIVE: 1
HEMATOLOGIC/LYMPHATIC NEGATIVE: 1
RESPIRATORY NEGATIVE: 1

## 2020-09-16 ASSESSMENT — PAIN SCALES - GENERAL: PAINLEVEL: 0

## 2020-09-17 DIAGNOSIS — E03.9 ACQUIRED HYPOTHYROIDISM: ICD-10-CM

## 2020-09-17 DIAGNOSIS — M81.0 AGE-RELATED OSTEOPOROSIS WITHOUT CURRENT PATHOLOGICAL FRACTURE: ICD-10-CM

## 2020-09-17 DIAGNOSIS — D46.9 MYELODYSPLASTIC SYNDROME (CMD): Primary | ICD-10-CM

## 2020-09-17 DIAGNOSIS — R73.01 IFG (IMPAIRED FASTING GLUCOSE): ICD-10-CM

## 2020-09-17 DIAGNOSIS — E78.49 OTHER HYPERLIPIDEMIA: ICD-10-CM

## 2020-09-20 ASSESSMENT — ENCOUNTER SYMPTOMS
PSYCHIATRIC NEGATIVE: 1
EYES NEGATIVE: 1
GASTROINTESTINAL NEGATIVE: 1
CONSTITUTIONAL NEGATIVE: 1
ENDOCRINE NEGATIVE: 1
NEUROLOGICAL NEGATIVE: 1
ALLERGIC/IMMUNOLOGIC NEGATIVE: 1
HEMATOLOGIC/LYMPHATIC NEGATIVE: 1
RESPIRATORY NEGATIVE: 1

## 2020-11-30 ENCOUNTER — TELEPHONE (OUTPATIENT)
Dept: SCHEDULING | Age: 65
End: 2020-11-30

## 2020-11-30 ENCOUNTER — OFFICE VISIT (OUTPATIENT)
Dept: INTERNAL MEDICINE | Age: 65
End: 2020-11-30

## 2020-11-30 DIAGNOSIS — M81.0 AGE-RELATED OSTEOPOROSIS WITHOUT CURRENT PATHOLOGICAL FRACTURE: ICD-10-CM

## 2020-11-30 DIAGNOSIS — D46.9 MYELODYSPLASTIC SYNDROME (CMD): ICD-10-CM

## 2020-11-30 DIAGNOSIS — E78.49 OTHER HYPERLIPIDEMIA: ICD-10-CM

## 2020-11-30 DIAGNOSIS — F20.9 SCHIZOPHRENIA, UNSPECIFIED TYPE (CMD): ICD-10-CM

## 2020-11-30 DIAGNOSIS — H11.31 NON-TRAUMATIC SUBCONJUNCTIVAL HEMORRHAGE OF RIGHT EYE: Primary | ICD-10-CM

## 2020-11-30 DIAGNOSIS — D70.8 OTHER NEUTROPENIA (CMD): ICD-10-CM

## 2020-11-30 PROCEDURE — 99213 OFFICE O/P EST LOW 20 MIN: CPT | Performed by: INTERNAL MEDICINE

## 2020-11-30 SDOH — HEALTH STABILITY: PHYSICAL HEALTH: ON AVERAGE, HOW MANY MINUTES DO YOU ENGAGE IN EXERCISE AT THIS LEVEL?: 30 MIN

## 2020-11-30 SDOH — HEALTH STABILITY: PHYSICAL HEALTH: ON AVERAGE, HOW MANY DAYS PER WEEK DO YOU ENGAGE IN MODERATE TO STRENUOUS EXERCISE (LIKE A BRISK WALK)?: 7 DAYS

## 2020-11-30 ASSESSMENT — PATIENT HEALTH QUESTIONNAIRE - PHQ9
CLINICAL INTERPRETATION OF PHQ9 SCORE: NO FURTHER SCREENING NEEDED
1. LITTLE INTEREST OR PLEASURE IN DOING THINGS: NOT AT ALL
2. FEELING DOWN, DEPRESSED OR HOPELESS: NOT AT ALL
SUM OF ALL RESPONSES TO PHQ9 QUESTIONS 1 AND 2: 0
SUM OF ALL RESPONSES TO PHQ9 QUESTIONS 1 AND 2: 0
CLINICAL INTERPRETATION OF PHQ2 SCORE: NO FURTHER SCREENING NEEDED

## 2020-11-30 ASSESSMENT — PAIN SCALES - GENERAL: PAINLEVEL: 0

## 2021-01-01 ENCOUNTER — EXTERNAL RECORD (OUTPATIENT)
Dept: HEALTH INFORMATION MANAGEMENT | Facility: OTHER | Age: 66
End: 2021-01-01

## 2021-01-20 ENCOUNTER — OFFICE VISIT (OUTPATIENT)
Dept: ENDOCRINOLOGY | Age: 66
End: 2021-01-20

## 2021-01-20 VITALS
SYSTOLIC BLOOD PRESSURE: 107 MMHG | BODY MASS INDEX: 26.56 KG/M2 | DIASTOLIC BLOOD PRESSURE: 65 MMHG | HEART RATE: 64 BPM | WEIGHT: 169.2 LBS | HEIGHT: 67 IN

## 2021-01-20 DIAGNOSIS — M81.0 AGE-RELATED OSTEOPOROSIS WITHOUT CURRENT PATHOLOGICAL FRACTURE: Primary | ICD-10-CM

## 2021-01-20 DIAGNOSIS — E03.9 ACQUIRED HYPOTHYROIDISM: ICD-10-CM

## 2021-01-20 PROCEDURE — 99214 OFFICE O/P EST MOD 30 MIN: CPT | Performed by: INTERNAL MEDICINE

## 2021-01-20 RX ORDER — ALENDRONATE SODIUM 70 MG/1
70 TABLET ORAL
Qty: 12 TABLET | Refills: 3 | Status: SHIPPED | OUTPATIENT
Start: 2021-01-20 | End: 2021-12-20

## 2021-01-20 ASSESSMENT — PATIENT HEALTH QUESTIONNAIRE - PHQ9
CLINICAL INTERPRETATION OF PHQ9 SCORE: NO FURTHER SCREENING NEEDED
SUM OF ALL RESPONSES TO PHQ9 QUESTIONS 1 AND 2: 0
SUM OF ALL RESPONSES TO PHQ9 QUESTIONS 1 AND 2: 0
1. LITTLE INTEREST OR PLEASURE IN DOING THINGS: NOT AT ALL
2. FEELING DOWN, DEPRESSED OR HOPELESS: NOT AT ALL
CLINICAL INTERPRETATION OF PHQ2 SCORE: NO FURTHER SCREENING NEEDED

## 2021-01-28 ENCOUNTER — IMAGING SERVICES (OUTPATIENT)
Dept: BONE DENSITY | Age: 66
End: 2021-01-28
Attending: INTERNAL MEDICINE

## 2021-01-28 DIAGNOSIS — M81.0 AGE-RELATED OSTEOPOROSIS WITHOUT CURRENT PATHOLOGICAL FRACTURE: ICD-10-CM

## 2021-01-28 PROCEDURE — 77080 DXA BONE DENSITY AXIAL: CPT | Performed by: RADIOLOGY

## 2021-02-03 ENCOUNTER — TELEPHONE (OUTPATIENT)
Dept: SCHEDULING | Age: 66
End: 2021-02-03

## 2021-02-05 ENCOUNTER — TELEPHONE (OUTPATIENT)
Dept: SCHEDULING | Age: 66
End: 2021-02-05

## 2021-02-11 ENCOUNTER — TELEPHONE (OUTPATIENT)
Dept: SCHEDULING | Age: 66
End: 2021-02-11

## 2021-02-11 ENCOUNTER — V-VISIT (OUTPATIENT)
Dept: INTERNAL MEDICINE | Age: 66
End: 2021-02-11

## 2021-02-11 DIAGNOSIS — F41.8 SITUATIONAL ANXIETY: ICD-10-CM

## 2021-02-11 DIAGNOSIS — F20.9 SCHIZOPHRENIA, UNSPECIFIED TYPE (CMD): ICD-10-CM

## 2021-02-11 DIAGNOSIS — Z56.6 WORK-RELATED STRESS: Primary | ICD-10-CM

## 2021-02-11 PROBLEM — H11.31 NON-TRAUMATIC SUBCONJUNCTIVAL HEMORRHAGE OF RIGHT EYE: Status: RESOLVED | Noted: 2020-11-30 | Resolved: 2021-02-11

## 2021-02-11 PROCEDURE — 99213 OFFICE O/P EST LOW 20 MIN: CPT | Performed by: INTERNAL MEDICINE

## 2021-02-11 SDOH — HEALTH STABILITY - MENTAL HEALTH: OTHER PHYSICAL AND MENTAL STRAIN RELATED TO WORK: Z56.6

## 2021-02-11 SDOH — HEALTH STABILITY: PHYSICAL HEALTH: ON AVERAGE, HOW MANY MINUTES DO YOU ENGAGE IN EXERCISE AT THIS LEVEL?: 30 MIN

## 2021-02-11 SDOH — HEALTH STABILITY: PHYSICAL HEALTH: ON AVERAGE, HOW MANY DAYS PER WEEK DO YOU ENGAGE IN MODERATE TO STRENUOUS EXERCISE (LIKE A BRISK WALK)?: 7 DAYS

## 2021-02-11 ASSESSMENT — PATIENT HEALTH QUESTIONNAIRE - PHQ9
CLINICAL INTERPRETATION OF PHQ9 SCORE: NO FURTHER SCREENING NEEDED
CLINICAL INTERPRETATION OF PHQ2 SCORE: NO FURTHER SCREENING NEEDED
SUM OF ALL RESPONSES TO PHQ9 QUESTIONS 1 AND 2: 0
2. FEELING DOWN, DEPRESSED OR HOPELESS: NOT AT ALL
SUM OF ALL RESPONSES TO PHQ9 QUESTIONS 1 AND 2: 0
1. LITTLE INTEREST OR PLEASURE IN DOING THINGS: NOT AT ALL

## 2021-03-11 ENCOUNTER — LAB SERVICES (OUTPATIENT)
Dept: LAB | Age: 66
End: 2021-03-11

## 2021-03-11 DIAGNOSIS — D46.9 MYELODYSPLASTIC SYNDROME (CMD): ICD-10-CM

## 2021-03-11 DIAGNOSIS — E03.9 ACQUIRED HYPOTHYROIDISM: ICD-10-CM

## 2021-03-11 DIAGNOSIS — M81.0 AGE-RELATED OSTEOPOROSIS WITHOUT CURRENT PATHOLOGICAL FRACTURE: ICD-10-CM

## 2021-03-11 DIAGNOSIS — R73.01 IFG (IMPAIRED FASTING GLUCOSE): ICD-10-CM

## 2021-03-11 DIAGNOSIS — D46.9 MDS (MYELODYSPLASTIC SYNDROME) (CMD): ICD-10-CM

## 2021-03-11 DIAGNOSIS — E78.49 OTHER HYPERLIPIDEMIA: ICD-10-CM

## 2021-03-11 LAB
DEPRECATED RDW RBC AUTO: 44.6 FL
ERYTHROCYTE [DISTWIDTH] IN BLOOD BY AUTOMATED COUNT: 13.6 %
HCT VFR BLD AUTO: 39.1 %
HGB BLD-MCNC: 11.9 G/DL (ref 12–15.5)
LYMPHOCYTES # BLD: 1.2 10*3/UL
LYMPHOCYTES NFR BLD: 54.5 %
MCH RBC QN AUTO: 27.4 PG
MCHC RBC AUTO-ENTMCNC: 30.4 G/DL
MCV RBC AUTO: 90.1 FL
NEUTROPHILS # BLD: 0.8 10*3/UL
NEUTROPHILS NFR BLD: 38.1 %
PLATELET # BLD AUTO: 200 10*3/UL
PMV BLD AUTO: 9.4 FL
RBC # BLD AUTO: 4.34 10*6/UL
WBC # BLD AUTO: 2.2 K/MCL
WBC OTHER # BLD: 0.2 10*3/UL
WBC OTHER NFR BLD: 7.4 %

## 2021-03-11 PROCEDURE — 36415 COLL VENOUS BLD VENIPUNCTURE: CPT

## 2021-03-11 PROCEDURE — 82306 VITAMIN D 25 HYDROXY: CPT | Performed by: INTERNAL MEDICINE

## 2021-03-11 PROCEDURE — 83036 HEMOGLOBIN GLYCOSYLATED A1C: CPT | Performed by: INTERNAL MEDICINE

## 2021-03-11 PROCEDURE — 80050 GENERAL HEALTH PANEL: CPT | Performed by: INTERNAL MEDICINE

## 2021-03-12 LAB
25(OH)D3+25(OH)D2 SERPL-MCNC: 44.7 NG/ML (ref 30–100)
ALBUMIN SERPL-MCNC: 3.7 G/DL (ref 3.6–5.1)
ALBUMIN/GLOB SERPL: 1.4 {RATIO} (ref 1–2.4)
ALP SERPL-CCNC: 74 UNITS/L (ref 45–117)
ALT SERPL-CCNC: 15 UNITS/L
ANION GAP SERPL CALC-SCNC: 14 MMOL/L (ref 10–20)
AST SERPL-CCNC: 12 UNITS/L
BILIRUB SERPL-MCNC: 0.5 MG/DL (ref 0.2–1)
BUN SERPL-MCNC: 18 MG/DL (ref 6–20)
BUN/CREAT SERPL: 24 (ref 7–25)
CALCIUM SERPL-MCNC: 9.3 MG/DL (ref 8.4–10.2)
CHLORIDE SERPL-SCNC: 107 MMOL/L (ref 98–107)
CO2 SERPL-SCNC: 26 MMOL/L (ref 21–32)
CREAT SERPL-MCNC: 0.74 MG/DL (ref 0.51–0.95)
FASTING DURATION TIME PATIENT: NORMAL H
GFR SERPLBLD BASED ON 1.73 SQ M-ARVRAT: >90 ML/MIN/1.73M2
GLOBULIN SER-MCNC: 2.7 G/DL (ref 2–4)
GLUCOSE SERPL-MCNC: 88 MG/DL (ref 65–99)
HBA1C MFR BLD: 5.6 % (ref 4.5–5.6)
POTASSIUM SERPL-SCNC: 4.4 MMOL/L (ref 3.4–5.1)
PROT SERPL-MCNC: 6.4 G/DL (ref 6.4–8.2)
SODIUM SERPL-SCNC: 143 MMOL/L (ref 135–145)
TSH SERPL-ACNC: 0.53 MCUNITS/ML (ref 0.35–5)

## 2021-03-16 ENCOUNTER — TELEPHONE (OUTPATIENT)
Dept: SCHEDULING | Age: 66
End: 2021-03-16

## 2021-03-19 ENCOUNTER — OFFICE VISIT (OUTPATIENT)
Dept: HEMATOLOGY/ONCOLOGY | Age: 66
End: 2021-03-19

## 2021-03-19 ENCOUNTER — APPOINTMENT (OUTPATIENT)
Dept: HEMATOLOGY/ONCOLOGY | Age: 66
End: 2021-03-19

## 2021-03-19 ENCOUNTER — APPOINTMENT (OUTPATIENT)
Dept: INTERNAL MEDICINE | Age: 66
End: 2021-03-19

## 2021-03-19 DIAGNOSIS — D46.9 MDS (MYELODYSPLASTIC SYNDROME) (CMD): Primary | ICD-10-CM

## 2021-03-19 PROCEDURE — 99214 OFFICE O/P EST MOD 30 MIN: CPT | Performed by: INTERNAL MEDICINE

## 2021-03-19 ASSESSMENT — ENCOUNTER SYMPTOMS
RESPIRATORY NEGATIVE: 1
EYES NEGATIVE: 1
ENDOCRINE NEGATIVE: 1
PSYCHIATRIC NEGATIVE: 1
GASTROINTESTINAL NEGATIVE: 1
HEMATOLOGIC/LYMPHATIC NEGATIVE: 1
NEUROLOGICAL NEGATIVE: 1
ALLERGIC/IMMUNOLOGIC NEGATIVE: 1
CONSTITUTIONAL NEGATIVE: 1

## 2021-03-19 ASSESSMENT — PATIENT HEALTH QUESTIONNAIRE - PHQ9
CLINICAL INTERPRETATION OF PHQ9 SCORE: NO FURTHER SCREENING NEEDED
CLINICAL INTERPRETATION OF PHQ2 SCORE: NO FURTHER SCREENING NEEDED
SUM OF ALL RESPONSES TO PHQ9 QUESTIONS 1 AND 2: 0
1. LITTLE INTEREST OR PLEASURE IN DOING THINGS: NOT AT ALL
SUM OF ALL RESPONSES TO PHQ9 QUESTIONS 1 AND 2: 0
2. FEELING DOWN, DEPRESSED OR HOPELESS: NOT AT ALL

## 2021-03-19 ASSESSMENT — PAIN SCALES - GENERAL: PAINLEVEL: 0

## 2021-03-24 ENCOUNTER — OFFICE VISIT (OUTPATIENT)
Dept: INTERNAL MEDICINE | Age: 66
End: 2021-03-24

## 2021-03-24 DIAGNOSIS — D46.9 MYELODYSPLASTIC SYNDROME (CMD): ICD-10-CM

## 2021-03-24 DIAGNOSIS — Z12.31 VISIT FOR SCREENING MAMMOGRAM: ICD-10-CM

## 2021-03-24 DIAGNOSIS — M81.0 AGE-RELATED OSTEOPOROSIS WITHOUT CURRENT PATHOLOGICAL FRACTURE: Primary | ICD-10-CM

## 2021-03-24 DIAGNOSIS — F20.9 SCHIZOPHRENIA, UNSPECIFIED TYPE (CMD): ICD-10-CM

## 2021-03-24 DIAGNOSIS — E78.49 OTHER HYPERLIPIDEMIA: ICD-10-CM

## 2021-03-24 DIAGNOSIS — Z23 NEED FOR VACCINATION: ICD-10-CM

## 2021-03-24 DIAGNOSIS — E03.9 ACQUIRED HYPOTHYROIDISM: ICD-10-CM

## 2021-03-24 DIAGNOSIS — D70.9 NEUTROPENIA, UNSPECIFIED TYPE (CMD): ICD-10-CM

## 2021-03-24 PROCEDURE — 99214 OFFICE O/P EST MOD 30 MIN: CPT | Performed by: INTERNAL MEDICINE

## 2021-03-24 SDOH — HEALTH STABILITY: PHYSICAL HEALTH: ON AVERAGE, HOW MANY DAYS PER WEEK DO YOU ENGAGE IN MODERATE TO STRENUOUS EXERCISE (LIKE A BRISK WALK)?: 7 DAYS

## 2021-03-24 SDOH — HEALTH STABILITY: PHYSICAL HEALTH: ON AVERAGE, HOW MANY MINUTES DO YOU ENGAGE IN EXERCISE AT THIS LEVEL?: 30 MIN

## 2021-03-24 ASSESSMENT — PATIENT HEALTH QUESTIONNAIRE - PHQ9
1. LITTLE INTEREST OR PLEASURE IN DOING THINGS: NOT AT ALL
SUM OF ALL RESPONSES TO PHQ9 QUESTIONS 1 AND 2: 0
CLINICAL INTERPRETATION OF PHQ9 SCORE: NO FURTHER SCREENING NEEDED
CLINICAL INTERPRETATION OF PHQ2 SCORE: NO FURTHER SCREENING NEEDED
2. FEELING DOWN, DEPRESSED OR HOPELESS: NOT AT ALL
SUM OF ALL RESPONSES TO PHQ9 QUESTIONS 1 AND 2: 0

## 2021-03-24 ASSESSMENT — PAIN SCALES - GENERAL: PAINLEVEL: 0

## 2021-03-25 ENCOUNTER — TELEPHONE (OUTPATIENT)
Dept: SCHEDULING | Age: 66
End: 2021-03-25

## 2021-03-25 ENCOUNTER — IMMUNIZATION (OUTPATIENT)
Dept: LAB | Age: 66
End: 2021-03-25

## 2021-03-25 DIAGNOSIS — Z23 NEED FOR VACCINATION: Primary | ICD-10-CM

## 2021-03-25 PROCEDURE — 0001A COVID 19 PFIZER-BIONTECH: CPT

## 2021-03-25 PROCEDURE — 91300 COVID 19 PFIZER-BIONTECH: CPT

## 2021-04-15 ENCOUNTER — TELEPHONE (OUTPATIENT)
Dept: SCHEDULING | Age: 66
End: 2021-04-15

## 2021-04-15 ENCOUNTER — IMMUNIZATION (OUTPATIENT)
Dept: LAB | Age: 66
End: 2021-04-15
Attending: HOSPITALIST

## 2021-04-15 DIAGNOSIS — Z23 NEED FOR VACCINATION: Primary | ICD-10-CM

## 2021-04-15 PROCEDURE — 91300 COVID 19 PFIZER-BIONTECH: CPT | Performed by: HOSPITALIST

## 2021-04-15 PROCEDURE — 0002A COVID 19 PFIZER-BIONTECH: CPT | Performed by: HOSPITALIST

## 2021-05-10 ENCOUNTER — TELEPHONE (OUTPATIENT)
Dept: SCHEDULING | Age: 66
End: 2021-05-10

## 2021-05-25 VITALS
SYSTOLIC BLOOD PRESSURE: 96 MMHG | OXYGEN SATURATION: 98 % | RESPIRATION RATE: 16 BRPM | SYSTOLIC BLOOD PRESSURE: 96 MMHG | TEMPERATURE: 97.7 F | SYSTOLIC BLOOD PRESSURE: 104 MMHG | BODY MASS INDEX: 26.28 KG/M2 | HEART RATE: 68 BPM | HEART RATE: 50 BPM | BODY MASS INDEX: 27.23 KG/M2 | WEIGHT: 184.97 LBS | HEIGHT: 67 IN | HEART RATE: 72 BPM | DIASTOLIC BLOOD PRESSURE: 72 MMHG | BODY MASS INDEX: 29.03 KG/M2 | RESPIRATION RATE: 18 BRPM | TEMPERATURE: 97.3 F | HEART RATE: 77 BPM | SYSTOLIC BLOOD PRESSURE: 99 MMHG | RESPIRATION RATE: 18 BRPM | DIASTOLIC BLOOD PRESSURE: 75 MMHG | TEMPERATURE: 98.1 F | WEIGHT: 164.46 LBS | SYSTOLIC BLOOD PRESSURE: 108 MMHG | SYSTOLIC BLOOD PRESSURE: 110 MMHG | DIASTOLIC BLOOD PRESSURE: 68 MMHG | OXYGEN SATURATION: 98 % | BODY MASS INDEX: 29.33 KG/M2 | DIASTOLIC BLOOD PRESSURE: 70 MMHG | HEIGHT: 67 IN | HEIGHT: 67 IN | WEIGHT: 186.73 LBS | BODY MASS INDEX: 29.31 KG/M2 | OXYGEN SATURATION: 98 % | BODY MASS INDEX: 25.81 KG/M2 | HEIGHT: 67 IN | TEMPERATURE: 98 F | TEMPERATURE: 97.5 F | DIASTOLIC BLOOD PRESSURE: 73 MMHG | BODY MASS INDEX: 29.12 KG/M2 | WEIGHT: 167.44 LBS | BODY MASS INDEX: 29.19 KG/M2 | HEART RATE: 86 BPM | WEIGHT: 186 LBS | HEART RATE: 76 BPM | BODY MASS INDEX: 29.45 KG/M2 | OXYGEN SATURATION: 97 % | SYSTOLIC BLOOD PRESSURE: 110 MMHG | SYSTOLIC BLOOD PRESSURE: 105 MMHG | RESPIRATION RATE: 17 BRPM | TEMPERATURE: 97.8 F | TEMPERATURE: 96.7 F | BODY MASS INDEX: 28.49 KG/M2 | OXYGEN SATURATION: 99 % | HEART RATE: 91 BPM | HEART RATE: 64 BPM | WEIGHT: 180.56 LBS | DIASTOLIC BLOOD PRESSURE: 62 MMHG | WEIGHT: 185.52 LBS | TEMPERATURE: 98.4 F | SYSTOLIC BLOOD PRESSURE: 109 MMHG | WEIGHT: 162 LBS | HEIGHT: 67 IN | WEIGHT: 186.84 LBS | HEIGHT: 67 IN | WEIGHT: 187.61 LBS | SYSTOLIC BLOOD PRESSURE: 105 MMHG | HEART RATE: 60 BPM | RESPIRATION RATE: 18 BRPM | RESPIRATION RATE: 18 BRPM | RESPIRATION RATE: 18 BRPM | RESPIRATION RATE: 18 BRPM | RESPIRATION RATE: 15 BRPM | TEMPERATURE: 97.8 F | HEART RATE: 73 BPM | BODY MASS INDEX: 29.58 KG/M2 | OXYGEN SATURATION: 100 % | HEIGHT: 67 IN | HEART RATE: 71 BPM | SYSTOLIC BLOOD PRESSURE: 102 MMHG | HEART RATE: 68 BPM | WEIGHT: 173.5 LBS | DIASTOLIC BLOOD PRESSURE: 67 MMHG | BODY MASS INDEX: 28.34 KG/M2 | DIASTOLIC BLOOD PRESSURE: 69 MMHG | HEIGHT: 67 IN | DIASTOLIC BLOOD PRESSURE: 65 MMHG | DIASTOLIC BLOOD PRESSURE: 64 MMHG | DIASTOLIC BLOOD PRESSURE: 72 MMHG | BODY MASS INDEX: 27.96 KG/M2 | SYSTOLIC BLOOD PRESSURE: 97 MMHG | HEART RATE: 78 BPM | HEIGHT: 67 IN | WEIGHT: 188.49 LBS | SYSTOLIC BLOOD PRESSURE: 92 MMHG | BODY MASS INDEX: 25.43 KG/M2 | OXYGEN SATURATION: 97 % | TEMPERATURE: 97.8 F | HEIGHT: 67 IN | SYSTOLIC BLOOD PRESSURE: 101 MMHG | OXYGEN SATURATION: 97 % | TEMPERATURE: 98.1 F | RESPIRATION RATE: 18 BRPM | RESPIRATION RATE: 18 BRPM | OXYGEN SATURATION: 97 % | HEART RATE: 70 BPM | HEIGHT: 67 IN | HEIGHT: 67 IN | TEMPERATURE: 98 F | HEIGHT: 67 IN | WEIGHT: 178.13 LBS | DIASTOLIC BLOOD PRESSURE: 66 MMHG | DIASTOLIC BLOOD PRESSURE: 67 MMHG | TEMPERATURE: 97.6 F | WEIGHT: 181.5 LBS | TEMPERATURE: 97.5 F | RESPIRATION RATE: 20 BRPM | HEIGHT: 67 IN | DIASTOLIC BLOOD PRESSURE: 72 MMHG

## 2021-05-26 ENCOUNTER — OFFICE VISIT (OUTPATIENT)
Dept: INTERNAL MEDICINE | Age: 66
End: 2021-05-26

## 2021-05-26 DIAGNOSIS — Z76.89 RETURN TO WORK EVALUATION: Primary | ICD-10-CM

## 2021-05-26 PROCEDURE — 99213 OFFICE O/P EST LOW 20 MIN: CPT | Performed by: INTERNAL MEDICINE

## 2021-05-26 ASSESSMENT — PATIENT HEALTH QUESTIONNAIRE - PHQ9
2. FEELING DOWN, DEPRESSED OR HOPELESS: NOT AT ALL
CLINICAL INTERPRETATION OF PHQ9 SCORE: NO FURTHER SCREENING NEEDED
1. LITTLE INTEREST OR PLEASURE IN DOING THINGS: NOT AT ALL
CLINICAL INTERPRETATION OF PHQ2 SCORE: NO FURTHER SCREENING NEEDED
SUM OF ALL RESPONSES TO PHQ9 QUESTIONS 1 AND 2: 0
SUM OF ALL RESPONSES TO PHQ9 QUESTIONS 1 AND 2: 0

## 2021-05-26 ASSESSMENT — PAIN SCALES - GENERAL: PAINLEVEL: 0

## 2021-05-27 VITALS
RESPIRATION RATE: 16 BRPM | SYSTOLIC BLOOD PRESSURE: 99 MMHG | BODY MASS INDEX: 26.33 KG/M2 | WEIGHT: 167.77 LBS | HEART RATE: 66 BPM | HEIGHT: 67 IN | TEMPERATURE: 96.4 F | DIASTOLIC BLOOD PRESSURE: 54 MMHG

## 2021-06-11 ENCOUNTER — LAB SERVICES (OUTPATIENT)
Dept: LAB | Age: 66
End: 2021-06-11

## 2021-06-11 ENCOUNTER — APPOINTMENT (OUTPATIENT)
Dept: LAB | Age: 66
End: 2021-06-11

## 2021-06-11 DIAGNOSIS — M81.0 AGE-RELATED OSTEOPOROSIS WITHOUT CURRENT PATHOLOGICAL FRACTURE: ICD-10-CM

## 2021-06-11 DIAGNOSIS — E03.9 ACQUIRED HYPOTHYROIDISM: ICD-10-CM

## 2021-06-11 DIAGNOSIS — D46.9 MYELODYSPLASTIC SYNDROME (CMD): ICD-10-CM

## 2021-06-11 DIAGNOSIS — E78.49 OTHER HYPERLIPIDEMIA: ICD-10-CM

## 2021-06-11 DIAGNOSIS — D46.9 MDS (MYELODYSPLASTIC SYNDROME) (CMD): ICD-10-CM

## 2021-06-11 DIAGNOSIS — D70.9 NEUTROPENIA, UNSPECIFIED TYPE (CMD): ICD-10-CM

## 2021-06-11 DIAGNOSIS — F20.9 SCHIZOPHRENIA, UNSPECIFIED TYPE (CMD): ICD-10-CM

## 2021-06-11 PROCEDURE — 85025 COMPLETE CBC W/AUTO DIFF WBC: CPT | Performed by: INTERNAL MEDICINE

## 2021-06-14 LAB
DEPRECATED RDW RBC AUTO: 50.9 FL
ERYTHROCYTE [DISTWIDTH] IN BLOOD BY AUTOMATED COUNT: 14.7 %
HCT VFR BLD AUTO: 43 %
HGB BLD-MCNC: 12.2 G/DL (ref 12–15.5)
LYMPHOCYTES # BLD: 1.3 10*3/UL
LYMPHOCYTES NFR BLD: 53.1 %
MCH RBC QN AUTO: 27.5 PG
MCHC RBC AUTO-ENTMCNC: 28.4 G/DL
MCV RBC AUTO: 97.1 FL
NEUTROPHILS # BLD: 0.9 10*3/UL
NEUTROPHILS NFR BLD: 40.5 %
PLATELET # BLD AUTO: 222 10*3/UL
PMV BLD AUTO: 9.8 FL
RBC # BLD AUTO: 4.43 10*6/UL
WBC # BLD AUTO: 2.4 K/MCL
WBC OTHER # BLD: 0.2 10*3/UL
WBC OTHER NFR BLD: 6.4 %

## 2021-06-21 ENCOUNTER — OFFICE VISIT (OUTPATIENT)
Dept: INTERNAL MEDICINE | Age: 66
End: 2021-06-21

## 2021-06-21 ENCOUNTER — OFFICE VISIT (OUTPATIENT)
Dept: HEMATOLOGY/ONCOLOGY | Age: 66
End: 2021-06-21

## 2021-06-21 VITALS
OXYGEN SATURATION: 99 % | TEMPERATURE: 97.3 F | DIASTOLIC BLOOD PRESSURE: 63 MMHG | BODY MASS INDEX: 25.41 KG/M2 | HEIGHT: 68 IN | HEART RATE: 70 BPM | SYSTOLIC BLOOD PRESSURE: 97 MMHG | RESPIRATION RATE: 18 BRPM | WEIGHT: 167.66 LBS

## 2021-06-21 VITALS
DIASTOLIC BLOOD PRESSURE: 62 MMHG | RESPIRATION RATE: 15 BRPM | HEART RATE: 54 BPM | TEMPERATURE: 97.3 F | WEIGHT: 167.77 LBS | HEIGHT: 68 IN | SYSTOLIC BLOOD PRESSURE: 102 MMHG | BODY MASS INDEX: 25.43 KG/M2

## 2021-06-21 DIAGNOSIS — Z23 NEED FOR PNEUMOCOCCAL VACCINATION: ICD-10-CM

## 2021-06-21 DIAGNOSIS — D46.9 MYELODYSPLASTIC SYNDROME (CMD): Primary | ICD-10-CM

## 2021-06-21 DIAGNOSIS — E55.9 VITAMIN D DEFICIENCY: ICD-10-CM

## 2021-06-21 DIAGNOSIS — E78.49 OTHER HYPERLIPIDEMIA: ICD-10-CM

## 2021-06-21 DIAGNOSIS — E03.9 ACQUIRED HYPOTHYROIDISM: ICD-10-CM

## 2021-06-21 DIAGNOSIS — D70.8 OTHER NEUTROPENIA (CMD): ICD-10-CM

## 2021-06-21 DIAGNOSIS — M81.0 AGE-RELATED OSTEOPOROSIS WITHOUT CURRENT PATHOLOGICAL FRACTURE: ICD-10-CM

## 2021-06-21 PROBLEM — T07.XXXA MULTIPLE BRUISES: Status: RESOLVED | Noted: 2019-11-11 | Resolved: 2021-06-21

## 2021-06-21 PROCEDURE — 99214 OFFICE O/P EST MOD 30 MIN: CPT | Performed by: INTERNAL MEDICINE

## 2021-06-21 PROCEDURE — 90471 IMMUNIZATION ADMIN: CPT

## 2021-06-21 PROCEDURE — 90732 PPSV23 VACC 2 YRS+ SUBQ/IM: CPT

## 2021-06-21 PROCEDURE — 99213 OFFICE O/P EST LOW 20 MIN: CPT | Performed by: INTERNAL MEDICINE

## 2021-06-21 ASSESSMENT — PATIENT HEALTH QUESTIONNAIRE - PHQ9
2. FEELING DOWN, DEPRESSED OR HOPELESS: NOT AT ALL
SUM OF ALL RESPONSES TO PHQ9 QUESTIONS 1 AND 2: 0
2. FEELING DOWN, DEPRESSED OR HOPELESS: NOT AT ALL
CLINICAL INTERPRETATION OF PHQ2 SCORE: NO FURTHER SCREENING NEEDED
SUM OF ALL RESPONSES TO PHQ9 QUESTIONS 1 AND 2: 0
1. LITTLE INTEREST OR PLEASURE IN DOING THINGS: NOT AT ALL
CLINICAL INTERPRETATION OF PHQ2 SCORE: NO FURTHER SCREENING NEEDED
1. LITTLE INTEREST OR PLEASURE IN DOING THINGS: NOT AT ALL
SUM OF ALL RESPONSES TO PHQ9 QUESTIONS 1 AND 2: 0
CLINICAL INTERPRETATION OF PHQ9 SCORE: NO FURTHER SCREENING NEEDED
CLINICAL INTERPRETATION OF PHQ9 SCORE: NO FURTHER SCREENING NEEDED
SUM OF ALL RESPONSES TO PHQ9 QUESTIONS 1 AND 2: 0

## 2021-06-21 ASSESSMENT — PAIN SCALES - GENERAL
PAINLEVEL: 0
PAINLEVEL: 0

## 2021-06-21 ASSESSMENT — ENCOUNTER SYMPTOMS
HEMATOLOGIC/LYMPHATIC NEGATIVE: 1
CONSTITUTIONAL NEGATIVE: 1
GASTROINTESTINAL NEGATIVE: 1
RESPIRATORY NEGATIVE: 1
ALLERGIC/IMMUNOLOGIC NEGATIVE: 1
NEUROLOGICAL NEGATIVE: 1
PSYCHIATRIC NEGATIVE: 1
ENDOCRINE NEGATIVE: 1
EYES NEGATIVE: 1

## 2021-07-12 RX ORDER — LEVOTHYROXINE SODIUM 0.1 MG/1
TABLET ORAL
Qty: 90 TABLET | Refills: 3 | OUTPATIENT
Start: 2021-07-12

## 2021-07-12 RX ORDER — LEVOTHYROXINE SODIUM 0.1 MG/1
100 TABLET ORAL DAILY
Qty: 90 TABLET | Refills: 3 | Status: SHIPPED | OUTPATIENT
Start: 2021-07-12 | End: 2021-09-09 | Stop reason: SDUPTHER

## 2021-08-25 ENCOUNTER — HOSPITAL ENCOUNTER (OUTPATIENT)
Dept: BEHAVIORAL HEALTH | Age: 66
Discharge: STILL ADMITTED OR ADMINISTRATIVE | End: 2021-08-25
Attending: PSYCHIATRY & NEUROLOGY

## 2021-08-25 PROCEDURE — 90791 PSYCH DIAGNOSTIC EVALUATION: CPT

## 2021-08-25 ASSESSMENT — PATIENT HEALTH QUESTIONNAIRE - PHQ9
1. LITTLE INTEREST OR PLEASURE IN DOING THINGS: SEVERAL DAYS
5. POOR APPETITE OR OVEREATING: NOT AT ALL
8. MOVING OR SPEAKING SO SLOWLY THAT OTHER PEOPLE COULD HAVE NOTICED. OR THE OPPOSITE, BEING SO FIGETY OR RESTLESS THAT YOU HAVE BEEN MOVING AROUND A LOT MORE THAN USUAL: NOT AT ALL
3. TROUBLE FALLING OR STAYING ASLEEP OR SLEEPING TOO MUCH: NOT AT ALL
6. FEELING BAD ABOUT YOURSELF - OR THAT YOU ARE A FAILURE OR HAVE LET YOURSELF OR YOUR FAMILY DOWN: NOT AT ALL
4. FEELING TIRED OR HAVING LITTLE ENERGY: SEVERAL DAYS
10. IF YOU CHECKED OFF ANY PROBLEMS, HOW DIFFICULT HAVE THESE PROBLEMS MADE IT FOR YOU TO DO YOUR WORK, TAKE CARE OF THINGS AT HOME, OR GET ALONG WITH OTHER PEOPLE: SOMEWHAT DIFFICULT
7. TROUBLE CONCENTRATING ON THINGS, SUCH AS READING THE NEWSPAPER OR WATCHING TELEVISION: SEVERAL DAYS
9. THOUGHTS THAT YOU WOULD BE BETTER OFF DEAD, OR OF HURTING YOURSELF: NOT AT ALL
SUM OF ALL RESPONSES TO PHQ QUESTIONS 1-9: 3
2. FEELING DOWN, DEPRESSED OR HOPELESS: NOT AT ALL

## 2021-08-25 ASSESSMENT — COGNITIVE AND FUNCTIONAL STATUS - GENERAL
BEHAVIOR: APPROPRIATE TO SITUATION
LEVEL_OF_CONSCIOUSNESS_CALCULATED: ALERT
JUDGEMENT: FAIR
MOTOR_BEHAVIOR-AGITATION_CALCULATED: CALM AND PURPOSEFUL
INSIGHT: FAIR
ATTENTION_CALCULATED: MAINTAINS ATTENTION
MOOD: ANXIOUS
COGNITIVE_CONTENT: APPROPRIATE TO CIRCUMSTANCE
APPEARANCE_AND_DRESS: APPROPRIATE TO SITUATION
COGNITIVE_PROCESS: ORGANIZED/COHERENT
MOTOR_BEHAVIOR-RETARDATION_CALCULATED: CALM AND PURPOSEFUL
SPEECH: CLEAR/UNDERSTANDABLE;RAMBLING SPEECH
AFFECT: APPROPRIATE TO SITUATION
ORIENTATION: ORIENTED (PERSON/PLACE/TIME)
MEMORY: INTACT
PERCEPTUAL_MISINTERPRETATIONS_HALLUCINATIONS: CLEAR REALITY BASED PERCEPTIONS
RELIABILITY: APPEARS TO BE TRUTHFUL/RELIABLE

## 2021-08-25 ASSESSMENT — ANXIETY QUESTIONNAIRES
3. WORRYING TOO MUCH ABOUT DIFFERENT THINGS: SEVERAL DAYS
1. FEELING NERVOUS, ANXIOUS, OR ON EDGE: NOT AT ALL
2. NOT BEING ABLE TO STOP OR CONTROL WORRYING: SEVERAL DAYS
4. TROUBLE RELAXING: NOT AT ALL
5. BEING SO RESTLESS THAT IT IS HARD TO SIT STILL: NOT AT ALL
7. FEELING AFRAID AS IF SOMETHING AWFUL MIGHT HAPPEN: SEVERAL DAYS
IF YOU CHECKED OFF ANY PROBLEMS ON THIS QUESTIONNAIRE, HOW DIFFICULT HAVE THESE PROBLEMS MADE IT FOR YOU TO DO YOUR WORK, TAKE CARE OF THINGS AT HOME, OR GET ALONG WITH OTHER PEOPLE: NOT DIFFICULT AT ALL
GAD7 TOTAL SCORE: 3
6. BECOMING EASILY ANNOYED OR IRRITABLE: NOT AT ALL

## 2021-08-25 ASSESSMENT — LIFESTYLE VARIABLES: HOW OFTEN DO YOU HAVE A DRINK CONTAINING ALCOHOL: NEVER

## 2021-08-25 ASSESSMENT — COLUMBIA-SUICIDE SEVERITY RATING SCALE - C-SSRS
ATTEMPT PAST THREE MONTHS: NO
ATTEMPT LIFETIME: NO

## 2021-08-26 ENCOUNTER — HOSPITAL ENCOUNTER (OUTPATIENT)
Dept: BEHAVIORAL HEALTH | Age: 66
Discharge: STILL ADMITTED OR ADMINISTRATIVE | End: 2021-08-26
Attending: PSYCHIATRY & NEUROLOGY

## 2021-08-26 VITALS — WEIGHT: 167 LBS | BODY MASS INDEX: 25.31 KG/M2 | HEIGHT: 68 IN

## 2021-08-26 PROCEDURE — 90853 GROUP PSYCHOTHERAPY: CPT

## 2021-08-26 ASSESSMENT — PAIN SCALES - GENERAL: PAINLEVEL_OUTOF10: 0

## 2021-08-27 ENCOUNTER — HOSPITAL ENCOUNTER (OUTPATIENT)
Dept: BEHAVIORAL HEALTH | Age: 66
Discharge: STILL ADMITTED OR ADMINISTRATIVE | End: 2021-08-27
Attending: PSYCHIATRY & NEUROLOGY

## 2021-08-27 PROCEDURE — 90853 GROUP PSYCHOTHERAPY: CPT

## 2021-08-30 ENCOUNTER — HOSPITAL ENCOUNTER (OUTPATIENT)
Dept: BEHAVIORAL HEALTH | Age: 66
Discharge: STILL ADMITTED OR ADMINISTRATIVE | End: 2021-08-30
Attending: PSYCHIATRY & NEUROLOGY

## 2021-08-30 PROCEDURE — 90853 GROUP PSYCHOTHERAPY: CPT

## 2021-08-31 ENCOUNTER — HOSPITAL ENCOUNTER (OUTPATIENT)
Dept: BEHAVIORAL HEALTH | Age: 66
Discharge: STILL ADMITTED OR ADMINISTRATIVE | End: 2021-08-31
Attending: PSYCHIATRY & NEUROLOGY

## 2021-08-31 PROCEDURE — 90853 GROUP PSYCHOTHERAPY: CPT

## 2021-09-01 ENCOUNTER — HOSPITAL ENCOUNTER (OUTPATIENT)
Dept: BEHAVIORAL HEALTH | Age: 66
Discharge: STILL ADMITTED OR ADMINISTRATIVE | End: 2021-09-01
Attending: PSYCHIATRY & NEUROLOGY

## 2021-09-01 PROCEDURE — 90853 GROUP PSYCHOTHERAPY: CPT

## 2021-09-02 ENCOUNTER — HOSPITAL ENCOUNTER (OUTPATIENT)
Dept: BEHAVIORAL HEALTH | Age: 66
Discharge: STILL ADMITTED OR ADMINISTRATIVE | End: 2021-09-02
Attending: PSYCHIATRY & NEUROLOGY

## 2021-09-02 PROCEDURE — 90853 GROUP PSYCHOTHERAPY: CPT

## 2021-09-03 ENCOUNTER — HOSPITAL ENCOUNTER (OUTPATIENT)
Dept: BEHAVIORAL HEALTH | Age: 66
Discharge: STILL ADMITTED OR ADMINISTRATIVE | End: 2021-09-03
Attending: PSYCHIATRY & NEUROLOGY

## 2021-09-03 ENCOUNTER — LAB SERVICES (OUTPATIENT)
Dept: LAB | Age: 66
End: 2021-09-03

## 2021-09-03 DIAGNOSIS — F20.9 SCHIZOPHRENIA, UNSPECIFIED TYPE (CMD): ICD-10-CM

## 2021-09-03 DIAGNOSIS — D70.9 NEUTROPENIA, UNSPECIFIED TYPE (CMD): ICD-10-CM

## 2021-09-03 DIAGNOSIS — E78.49 OTHER HYPERLIPIDEMIA: ICD-10-CM

## 2021-09-03 DIAGNOSIS — D46.9 MYELODYSPLASTIC SYNDROME (CMD): ICD-10-CM

## 2021-09-03 DIAGNOSIS — M81.0 AGE-RELATED OSTEOPOROSIS WITHOUT CURRENT PATHOLOGICAL FRACTURE: ICD-10-CM

## 2021-09-03 DIAGNOSIS — E03.9 ACQUIRED HYPOTHYROIDISM: ICD-10-CM

## 2021-09-03 LAB
DEPRECATED RDW RBC AUTO: 42.9 FL
ERYTHROCYTE [DISTWIDTH] IN BLOOD BY AUTOMATED COUNT: 13.4 %
HCT VFR BLD AUTO: 39.2 %
HGB BLD-MCNC: 12.5 G/DL (ref 12–15.5)
LYMPHOCYTES # BLD: 1.3 10*3/UL
LYMPHOCYTES NFR BLD: 46.9 %
MCH RBC QN AUTO: 28.2 PG
MCHC RBC AUTO-ENTMCNC: 31.9 G/DL
MCV RBC AUTO: 88.3 FL
NEUTROPHILS # BLD: 1.1 10*3/UL
NEUTROPHILS NFR BLD: 43.6 %
PLATELET # BLD AUTO: 219 10*3/UL
PMV BLD AUTO: 9.5 FL
RBC # BLD AUTO: 4.44 10*6/UL
WBC # BLD AUTO: 2.7 K/MCL
WBC OTHER # BLD: 0.3 10*3/UL
WBC OTHER NFR BLD: 9.5 %

## 2021-09-03 PROCEDURE — 90853 GROUP PSYCHOTHERAPY: CPT

## 2021-09-03 PROCEDURE — 82043 UR ALBUMIN QUANTITATIVE: CPT | Performed by: INTERNAL MEDICINE

## 2021-09-03 PROCEDURE — 82570 ASSAY OF URINE CREATININE: CPT | Performed by: INTERNAL MEDICINE

## 2021-09-03 PROCEDURE — 36415 COLL VENOUS BLD VENIPUNCTURE: CPT

## 2021-09-03 PROCEDURE — 85025 COMPLETE CBC W/AUTO DIFF WBC: CPT | Performed by: INTERNAL MEDICINE

## 2021-09-03 PROCEDURE — 81003 URINALYSIS AUTO W/O SCOPE: CPT | Performed by: INTERNAL MEDICINE

## 2021-09-04 LAB
APPEARANCE UR: NORMAL
BILIRUB UR QL STRIP: NEGATIVE
COLOR UR: YELLOW
CREAT UR-MCNC: 81.1 MG/DL
GLUCOSE UR STRIP-MCNC: NEGATIVE MG/DL
HGB UR QL STRIP: NEGATIVE
KETONES UR STRIP-MCNC: NEGATIVE MG/DL
LEUKOCYTE ESTERASE UR QL STRIP: NEGATIVE
MICROALBUMIN UR-MCNC: <0.5 MG/DL
MICROALBUMIN/CREAT UR: NORMAL MG/G{CREAT}
NITRITE UR QL STRIP: NEGATIVE
PH UR STRIP: 7 [PH] (ref 5–7)
PROT UR STRIP-MCNC: NEGATIVE MG/DL
SP GR UR STRIP: 1.02 (ref 1–1.03)
UROBILINOGEN UR STRIP-MCNC: 0.2 MG/DL

## 2021-09-06 ENCOUNTER — APPOINTMENT (OUTPATIENT)
Dept: BEHAVIORAL HEALTH | Age: 66
End: 2021-09-06
Attending: PSYCHIATRY & NEUROLOGY

## 2021-09-07 ENCOUNTER — HOSPITAL ENCOUNTER (OUTPATIENT)
Dept: BEHAVIORAL HEALTH | Age: 66
Discharge: STILL ADMITTED OR ADMINISTRATIVE | End: 2021-09-07
Attending: PSYCHIATRY & NEUROLOGY

## 2021-09-07 PROCEDURE — 90853 GROUP PSYCHOTHERAPY: CPT

## 2021-09-08 ENCOUNTER — HOSPITAL ENCOUNTER (OUTPATIENT)
Dept: BEHAVIORAL HEALTH | Age: 66
Discharge: STILL ADMITTED OR ADMINISTRATIVE | End: 2021-09-08
Attending: PSYCHIATRY & NEUROLOGY

## 2021-09-08 PROCEDURE — 90853 GROUP PSYCHOTHERAPY: CPT

## 2021-09-09 ENCOUNTER — HOSPITAL ENCOUNTER (OUTPATIENT)
Dept: BEHAVIORAL HEALTH | Age: 66
Discharge: STILL ADMITTED OR ADMINISTRATIVE | End: 2021-09-09
Attending: PSYCHIATRY & NEUROLOGY

## 2021-09-09 ENCOUNTER — TELEPHONE (OUTPATIENT)
Dept: SCHEDULING | Age: 66
End: 2021-09-09

## 2021-09-09 PROCEDURE — 90853 GROUP PSYCHOTHERAPY: CPT

## 2021-09-09 RX ORDER — LEVOTHYROXINE SODIUM 0.1 MG/1
100 TABLET ORAL DAILY
Qty: 90 TABLET | Refills: 3 | Status: SHIPPED | OUTPATIENT
Start: 2021-09-09 | End: 2022-03-16 | Stop reason: SDUPTHER

## 2021-09-10 ENCOUNTER — HOSPITAL ENCOUNTER (OUTPATIENT)
Dept: BEHAVIORAL HEALTH | Age: 66
Discharge: STILL ADMITTED OR ADMINISTRATIVE | End: 2021-09-10
Attending: PSYCHIATRY & NEUROLOGY

## 2021-09-10 PROCEDURE — 90853 GROUP PSYCHOTHERAPY: CPT

## 2021-09-10 ASSESSMENT — ANXIETY QUESTIONNAIRES
5. BEING SO RESTLESS THAT IT IS HARD TO SIT STILL: NOT AT ALL
3. WORRYING TOO MUCH ABOUT DIFFERENT THINGS: NOT AT ALL
2. NOT BEING ABLE TO STOP OR CONTROL WORRYING: SEVERAL DAYS
6. BECOMING EASILY ANNOYED OR IRRITABLE: SEVERAL DAYS
GAD7 TOTAL SCORE: 2
7. FEELING AFRAID AS IF SOMETHING AWFUL MIGHT HAPPEN: NOT AT ALL
IF YOU CHECKED OFF ANY PROBLEMS ON THIS QUESTIONNAIRE, HOW DIFFICULT HAVE THESE PROBLEMS MADE IT FOR YOU TO DO YOUR WORK, TAKE CARE OF THINGS AT HOME, OR GET ALONG WITH OTHER PEOPLE: NOT DIFFICULT AT ALL
4. TROUBLE RELAXING: NOT AT ALL
1. FEELING NERVOUS, ANXIOUS, OR ON EDGE: NOT AT ALL

## 2021-09-10 ASSESSMENT — PATIENT HEALTH QUESTIONNAIRE - PHQ9
5. POOR APPETITE OR OVEREATING: NOT AT ALL
4. FEELING TIRED OR HAVING LITTLE ENERGY: NOT AT ALL
8. MOVING OR SPEAKING SO SLOWLY THAT OTHER PEOPLE COULD HAVE NOTICED. OR THE OPPOSITE, BEING SO FIGETY OR RESTLESS THAT YOU HAVE BEEN MOVING AROUND A LOT MORE THAN USUAL: SEVERAL DAYS
7. TROUBLE CONCENTRATING ON THINGS, SUCH AS READING THE NEWSPAPER OR WATCHING TELEVISION: NOT AT ALL
2. FEELING DOWN, DEPRESSED OR HOPELESS: NOT AT ALL
3. TROUBLE FALLING OR STAYING ASLEEP OR SLEEPING TOO MUCH: NOT AT ALL
9. THOUGHTS THAT YOU WOULD BE BETTER OFF DEAD, OR OF HURTING YOURSELF: NOT AT ALL
6. FEELING BAD ABOUT YOURSELF - OR THAT YOU ARE A FAILURE OR HAVE LET YOURSELF OR YOUR FAMILY DOWN: NOT AT ALL
10. IF YOU CHECKED OFF ANY PROBLEMS, HOW DIFFICULT HAVE THESE PROBLEMS MADE IT FOR YOU TO DO YOUR WORK, TAKE CARE OF THINGS AT HOME, OR GET ALONG WITH OTHER PEOPLE: NOT DIFFICULT AT ALL
1. LITTLE INTEREST OR PLEASURE IN DOING THINGS: NOT AT ALL
SUM OF ALL RESPONSES TO PHQ QUESTIONS 1-9: 1

## 2021-09-13 ENCOUNTER — IMAGING SERVICES (OUTPATIENT)
Dept: MAMMOGRAPHY | Age: 66
End: 2021-09-13

## 2021-09-13 ENCOUNTER — OFFICE VISIT (OUTPATIENT)
Dept: INTERNAL MEDICINE | Age: 66
End: 2021-09-13

## 2021-09-13 ENCOUNTER — APPOINTMENT (OUTPATIENT)
Dept: BEHAVIORAL HEALTH | Age: 66
End: 2021-09-13
Attending: PSYCHIATRY & NEUROLOGY

## 2021-09-13 VITALS
TEMPERATURE: 97.8 F | BODY MASS INDEX: 25.84 KG/M2 | HEART RATE: 58 BPM | SYSTOLIC BLOOD PRESSURE: 107 MMHG | DIASTOLIC BLOOD PRESSURE: 60 MMHG | RESPIRATION RATE: 15 BRPM | HEIGHT: 68 IN | WEIGHT: 170.53 LBS

## 2021-09-13 DIAGNOSIS — E78.49 OTHER HYPERLIPIDEMIA: ICD-10-CM

## 2021-09-13 DIAGNOSIS — R92.30 DENSE BREASTS: Primary | ICD-10-CM

## 2021-09-13 DIAGNOSIS — D46.9 MYELODYSPLASTIC SYNDROME (CMD): ICD-10-CM

## 2021-09-13 DIAGNOSIS — Z12.31 VISIT FOR SCREENING MAMMOGRAM: ICD-10-CM

## 2021-09-13 DIAGNOSIS — E55.9 VITAMIN D DEFICIENCY: ICD-10-CM

## 2021-09-13 DIAGNOSIS — R92.2 DENSE BREASTS: Primary | ICD-10-CM

## 2021-09-13 DIAGNOSIS — F20.9 SCHIZOPHRENIA, UNSPECIFIED TYPE (CMD): ICD-10-CM

## 2021-09-13 DIAGNOSIS — E03.9 ACQUIRED HYPOTHYROIDISM: ICD-10-CM

## 2021-09-13 DIAGNOSIS — M81.0 AGE-RELATED OSTEOPOROSIS WITHOUT CURRENT PATHOLOGICAL FRACTURE: ICD-10-CM

## 2021-09-13 DIAGNOSIS — D70.8 OTHER NEUTROPENIA (CMD): ICD-10-CM

## 2021-09-13 PROCEDURE — 90686 IIV4 VACC NO PRSV 0.5 ML IM: CPT

## 2021-09-13 PROCEDURE — 90471 IMMUNIZATION ADMIN: CPT

## 2021-09-13 PROCEDURE — 77063 BREAST TOMOSYNTHESIS BI: CPT | Performed by: INTERNAL MEDICINE

## 2021-09-13 PROCEDURE — 77067 SCR MAMMO BI INCL CAD: CPT | Performed by: INTERNAL MEDICINE

## 2021-09-13 PROCEDURE — 99214 OFFICE O/P EST MOD 30 MIN: CPT | Performed by: INTERNAL MEDICINE

## 2021-09-13 ASSESSMENT — PATIENT HEALTH QUESTIONNAIRE - PHQ9
CLINICAL INTERPRETATION OF PHQ2 SCORE: NO FURTHER SCREENING NEEDED
2. FEELING DOWN, DEPRESSED OR HOPELESS: NOT AT ALL
CLINICAL INTERPRETATION OF PHQ9 SCORE: NO FURTHER SCREENING NEEDED
1. LITTLE INTEREST OR PLEASURE IN DOING THINGS: NOT AT ALL
SUM OF ALL RESPONSES TO PHQ9 QUESTIONS 1 AND 2: 0
1. LITTLE INTEREST OR PLEASURE IN DOING THINGS: NOT AT ALL
CLINICAL INTERPRETATION OF PHQ2 SCORE: NO FURTHER SCREENING NEEDED
SUM OF ALL RESPONSES TO PHQ9 QUESTIONS 1 AND 2: 0
2. FEELING DOWN, DEPRESSED OR HOPELESS: NOT AT ALL
SUM OF ALL RESPONSES TO PHQ9 QUESTIONS 1 AND 2: 0

## 2021-09-13 ASSESSMENT — PAIN SCALES - GENERAL: PAINLEVEL: 0

## 2021-09-14 ENCOUNTER — APPOINTMENT (OUTPATIENT)
Dept: BEHAVIORAL HEALTH | Age: 66
End: 2021-09-14
Attending: PSYCHIATRY & NEUROLOGY

## 2021-09-15 ENCOUNTER — APPOINTMENT (OUTPATIENT)
Dept: BEHAVIORAL HEALTH | Age: 66
End: 2021-09-15
Attending: PSYCHIATRY & NEUROLOGY

## 2021-09-16 ENCOUNTER — APPOINTMENT (OUTPATIENT)
Dept: BEHAVIORAL HEALTH | Age: 66
End: 2021-09-16
Attending: PSYCHIATRY & NEUROLOGY

## 2021-09-17 ENCOUNTER — HOSPITAL ENCOUNTER (OUTPATIENT)
Dept: ULTRASOUND IMAGING | Age: 66
Discharge: HOME | End: 2021-09-17
Attending: INTERNAL MEDICINE

## 2021-09-17 ENCOUNTER — APPOINTMENT (OUTPATIENT)
Dept: BEHAVIORAL HEALTH | Age: 66
End: 2021-09-17
Attending: PSYCHIATRY & NEUROLOGY

## 2021-09-17 DIAGNOSIS — R92.30 DENSE BREASTS: ICD-10-CM

## 2021-09-17 DIAGNOSIS — R92.2 DENSE BREASTS: ICD-10-CM

## 2021-09-17 PROCEDURE — 76641 ULTRASOUND BREAST COMPLETE: CPT

## 2021-09-20 ENCOUNTER — APPOINTMENT (OUTPATIENT)
Dept: BEHAVIORAL HEALTH | Age: 66
End: 2021-09-20
Attending: PSYCHIATRY & NEUROLOGY

## 2021-09-21 ENCOUNTER — APPOINTMENT (OUTPATIENT)
Dept: BEHAVIORAL HEALTH | Age: 66
End: 2021-09-21
Attending: PSYCHIATRY & NEUROLOGY

## 2021-09-21 ENCOUNTER — OFFICE VISIT (OUTPATIENT)
Dept: HEMATOLOGY/ONCOLOGY | Age: 66
End: 2021-09-21

## 2021-09-21 ENCOUNTER — LAB SERVICES (OUTPATIENT)
Dept: LAB | Age: 66
End: 2021-09-21

## 2021-09-21 VITALS
HEART RATE: 76 BPM | HEIGHT: 68 IN | OXYGEN SATURATION: 97 % | WEIGHT: 170 LBS | SYSTOLIC BLOOD PRESSURE: 101 MMHG | BODY MASS INDEX: 25.76 KG/M2 | RESPIRATION RATE: 18 BRPM | TEMPERATURE: 98 F | DIASTOLIC BLOOD PRESSURE: 65 MMHG

## 2021-09-21 DIAGNOSIS — D46.9 MYELODYSPLASTIC SYNDROME (CMD): Primary | ICD-10-CM

## 2021-09-21 PROCEDURE — 99214 OFFICE O/P EST MOD 30 MIN: CPT | Performed by: INTERNAL MEDICINE

## 2021-09-21 ASSESSMENT — ENCOUNTER SYMPTOMS
GASTROINTESTINAL NEGATIVE: 1
NEUROLOGICAL NEGATIVE: 1
ALLERGIC/IMMUNOLOGIC NEGATIVE: 1
RESPIRATORY NEGATIVE: 1
CONSTITUTIONAL NEGATIVE: 1
HEMATOLOGIC/LYMPHATIC NEGATIVE: 1
PSYCHIATRIC NEGATIVE: 1
ENDOCRINE NEGATIVE: 1
EYES NEGATIVE: 1

## 2021-09-21 ASSESSMENT — PATIENT HEALTH QUESTIONNAIRE - PHQ9
CLINICAL INTERPRETATION OF PHQ2 SCORE: NO FURTHER SCREENING NEEDED
1. LITTLE INTEREST OR PLEASURE IN DOING THINGS: NOT AT ALL
SUM OF ALL RESPONSES TO PHQ9 QUESTIONS 1 AND 2: 0
SUM OF ALL RESPONSES TO PHQ9 QUESTIONS 1 AND 2: 0
CLINICAL INTERPRETATION OF PHQ9 SCORE: NO FURTHER SCREENING NEEDED
2. FEELING DOWN, DEPRESSED OR HOPELESS: NOT AT ALL

## 2021-09-22 ENCOUNTER — APPOINTMENT (OUTPATIENT)
Dept: BEHAVIORAL HEALTH | Age: 66
End: 2021-09-22
Attending: PSYCHIATRY & NEUROLOGY

## 2021-09-23 ENCOUNTER — APPOINTMENT (OUTPATIENT)
Dept: BEHAVIORAL HEALTH | Age: 66
End: 2021-09-23
Attending: PSYCHIATRY & NEUROLOGY

## 2021-09-24 ENCOUNTER — APPOINTMENT (OUTPATIENT)
Dept: BEHAVIORAL HEALTH | Age: 66
End: 2021-09-24
Attending: PSYCHIATRY & NEUROLOGY

## 2021-09-27 ENCOUNTER — APPOINTMENT (OUTPATIENT)
Dept: BEHAVIORAL HEALTH | Age: 66
End: 2021-09-27
Attending: PSYCHIATRY & NEUROLOGY

## 2021-09-28 ENCOUNTER — APPOINTMENT (OUTPATIENT)
Dept: BEHAVIORAL HEALTH | Age: 66
End: 2021-09-28
Attending: PSYCHIATRY & NEUROLOGY

## 2021-09-29 ENCOUNTER — APPOINTMENT (OUTPATIENT)
Dept: BEHAVIORAL HEALTH | Age: 66
End: 2021-09-29
Attending: PSYCHIATRY & NEUROLOGY

## 2021-09-30 ENCOUNTER — APPOINTMENT (OUTPATIENT)
Dept: BEHAVIORAL HEALTH | Age: 66
End: 2021-09-30
Attending: PSYCHIATRY & NEUROLOGY

## 2021-10-01 ENCOUNTER — APPOINTMENT (OUTPATIENT)
Dept: BEHAVIORAL HEALTH | Age: 66
End: 2021-10-01
Attending: PSYCHIATRY & NEUROLOGY

## 2021-10-04 ENCOUNTER — APPOINTMENT (OUTPATIENT)
Dept: BEHAVIORAL HEALTH | Age: 66
End: 2021-10-04
Attending: PSYCHIATRY & NEUROLOGY

## 2021-10-05 ENCOUNTER — APPOINTMENT (OUTPATIENT)
Dept: BEHAVIORAL HEALTH | Age: 66
End: 2021-10-05
Attending: PSYCHIATRY & NEUROLOGY

## 2021-10-06 ENCOUNTER — APPOINTMENT (OUTPATIENT)
Dept: BEHAVIORAL HEALTH | Age: 66
End: 2021-10-06
Attending: PSYCHIATRY & NEUROLOGY

## 2021-10-07 ENCOUNTER — APPOINTMENT (OUTPATIENT)
Dept: BEHAVIORAL HEALTH | Age: 66
End: 2021-10-07
Attending: PSYCHIATRY & NEUROLOGY

## 2021-10-08 ENCOUNTER — APPOINTMENT (OUTPATIENT)
Dept: BEHAVIORAL HEALTH | Age: 66
End: 2021-10-08
Attending: PSYCHIATRY & NEUROLOGY

## 2021-10-11 ENCOUNTER — APPOINTMENT (OUTPATIENT)
Dept: BEHAVIORAL HEALTH | Age: 66
End: 2021-10-11
Attending: PSYCHIATRY & NEUROLOGY

## 2021-10-12 ENCOUNTER — APPOINTMENT (OUTPATIENT)
Dept: BEHAVIORAL HEALTH | Age: 66
End: 2021-10-12
Attending: PSYCHIATRY & NEUROLOGY

## 2021-10-13 ENCOUNTER — APPOINTMENT (OUTPATIENT)
Dept: BEHAVIORAL HEALTH | Age: 66
End: 2021-10-13
Attending: PSYCHIATRY & NEUROLOGY

## 2021-10-14 ENCOUNTER — APPOINTMENT (OUTPATIENT)
Dept: BEHAVIORAL HEALTH | Age: 66
End: 2021-10-14
Attending: PSYCHIATRY & NEUROLOGY

## 2021-10-15 ENCOUNTER — APPOINTMENT (OUTPATIENT)
Dept: BEHAVIORAL HEALTH | Age: 66
End: 2021-10-15
Attending: PSYCHIATRY & NEUROLOGY

## 2021-10-18 ENCOUNTER — APPOINTMENT (OUTPATIENT)
Dept: BEHAVIORAL HEALTH | Age: 66
End: 2021-10-18
Attending: PSYCHIATRY & NEUROLOGY

## 2021-10-19 ENCOUNTER — APPOINTMENT (OUTPATIENT)
Dept: BEHAVIORAL HEALTH | Age: 66
End: 2021-10-19
Attending: PSYCHIATRY & NEUROLOGY

## 2021-10-20 ENCOUNTER — APPOINTMENT (OUTPATIENT)
Dept: BEHAVIORAL HEALTH | Age: 66
End: 2021-10-20
Attending: PSYCHIATRY & NEUROLOGY

## 2021-10-21 ENCOUNTER — APPOINTMENT (OUTPATIENT)
Dept: BEHAVIORAL HEALTH | Age: 66
End: 2021-10-21
Attending: PSYCHIATRY & NEUROLOGY

## 2021-10-22 ENCOUNTER — APPOINTMENT (OUTPATIENT)
Dept: BEHAVIORAL HEALTH | Age: 66
End: 2021-10-22
Attending: PSYCHIATRY & NEUROLOGY

## 2021-10-25 ENCOUNTER — APPOINTMENT (OUTPATIENT)
Dept: BEHAVIORAL HEALTH | Age: 66
End: 2021-10-25
Attending: PSYCHIATRY & NEUROLOGY

## 2021-10-26 ENCOUNTER — APPOINTMENT (OUTPATIENT)
Dept: BEHAVIORAL HEALTH | Age: 66
End: 2021-10-26
Attending: PSYCHIATRY & NEUROLOGY

## 2021-10-27 ENCOUNTER — APPOINTMENT (OUTPATIENT)
Dept: BEHAVIORAL HEALTH | Age: 66
End: 2021-10-27
Attending: PSYCHIATRY & NEUROLOGY

## 2021-10-28 ENCOUNTER — APPOINTMENT (OUTPATIENT)
Dept: BEHAVIORAL HEALTH | Age: 66
End: 2021-10-28
Attending: PSYCHIATRY & NEUROLOGY

## 2021-10-29 ENCOUNTER — APPOINTMENT (OUTPATIENT)
Dept: BEHAVIORAL HEALTH | Age: 66
End: 2021-10-29
Attending: PSYCHIATRY & NEUROLOGY

## 2021-11-01 ENCOUNTER — APPOINTMENT (OUTPATIENT)
Dept: BEHAVIORAL HEALTH | Age: 66
End: 2021-11-01
Attending: PSYCHIATRY & NEUROLOGY

## 2021-11-02 ENCOUNTER — APPOINTMENT (OUTPATIENT)
Dept: BEHAVIORAL HEALTH | Age: 66
End: 2021-11-02
Attending: PSYCHIATRY & NEUROLOGY

## 2021-11-03 ENCOUNTER — APPOINTMENT (OUTPATIENT)
Dept: BEHAVIORAL HEALTH | Age: 66
End: 2021-11-03
Attending: PSYCHIATRY & NEUROLOGY

## 2021-11-04 ENCOUNTER — APPOINTMENT (OUTPATIENT)
Dept: BEHAVIORAL HEALTH | Age: 66
End: 2021-11-04
Attending: PSYCHIATRY & NEUROLOGY

## 2021-11-05 ENCOUNTER — APPOINTMENT (OUTPATIENT)
Dept: BEHAVIORAL HEALTH | Age: 66
End: 2021-11-05
Attending: PSYCHIATRY & NEUROLOGY

## 2021-11-08 ENCOUNTER — APPOINTMENT (OUTPATIENT)
Dept: BEHAVIORAL HEALTH | Age: 66
End: 2021-11-08
Attending: PSYCHIATRY & NEUROLOGY

## 2021-11-09 ENCOUNTER — APPOINTMENT (OUTPATIENT)
Dept: BEHAVIORAL HEALTH | Age: 66
End: 2021-11-09
Attending: PSYCHIATRY & NEUROLOGY

## 2021-11-10 ENCOUNTER — APPOINTMENT (OUTPATIENT)
Dept: BEHAVIORAL HEALTH | Age: 66
End: 2021-11-10
Attending: PSYCHIATRY & NEUROLOGY

## 2021-11-11 ENCOUNTER — APPOINTMENT (OUTPATIENT)
Dept: BEHAVIORAL HEALTH | Age: 66
End: 2021-11-11
Attending: PSYCHIATRY & NEUROLOGY

## 2021-11-12 ENCOUNTER — APPOINTMENT (OUTPATIENT)
Dept: BEHAVIORAL HEALTH | Age: 66
End: 2021-11-12
Attending: PSYCHIATRY & NEUROLOGY

## 2021-11-15 ENCOUNTER — APPOINTMENT (OUTPATIENT)
Dept: BEHAVIORAL HEALTH | Age: 66
End: 2021-11-15
Attending: PSYCHIATRY & NEUROLOGY

## 2021-11-16 ENCOUNTER — APPOINTMENT (OUTPATIENT)
Dept: BEHAVIORAL HEALTH | Age: 66
End: 2021-11-16
Attending: PSYCHIATRY & NEUROLOGY

## 2021-11-17 ENCOUNTER — APPOINTMENT (OUTPATIENT)
Dept: BEHAVIORAL HEALTH | Age: 66
End: 2021-11-17
Attending: PSYCHIATRY & NEUROLOGY

## 2021-11-18 ENCOUNTER — APPOINTMENT (OUTPATIENT)
Dept: BEHAVIORAL HEALTH | Age: 66
End: 2021-11-18
Attending: PSYCHIATRY & NEUROLOGY

## 2021-11-19 ENCOUNTER — APPOINTMENT (OUTPATIENT)
Dept: BEHAVIORAL HEALTH | Age: 66
End: 2021-11-19
Attending: PSYCHIATRY & NEUROLOGY

## 2021-11-22 ENCOUNTER — APPOINTMENT (OUTPATIENT)
Dept: BEHAVIORAL HEALTH | Age: 66
End: 2021-11-22
Attending: PSYCHIATRY & NEUROLOGY

## 2021-11-23 ENCOUNTER — APPOINTMENT (OUTPATIENT)
Dept: BEHAVIORAL HEALTH | Age: 66
End: 2021-11-23
Attending: PSYCHIATRY & NEUROLOGY

## 2021-11-24 ENCOUNTER — APPOINTMENT (OUTPATIENT)
Dept: BEHAVIORAL HEALTH | Age: 66
End: 2021-11-24
Attending: PSYCHIATRY & NEUROLOGY

## 2021-11-25 ENCOUNTER — APPOINTMENT (OUTPATIENT)
Dept: BEHAVIORAL HEALTH | Age: 66
End: 2021-11-25
Attending: PSYCHIATRY & NEUROLOGY

## 2021-12-14 ENCOUNTER — IMMUNIZATION (OUTPATIENT)
Dept: INTERNAL MEDICINE | Age: 66
End: 2021-12-14

## 2021-12-14 ENCOUNTER — LAB SERVICES (OUTPATIENT)
Dept: LAB | Age: 66
End: 2021-12-14

## 2021-12-14 DIAGNOSIS — Z23 NEED FOR VACCINATION: Primary | ICD-10-CM

## 2021-12-14 DIAGNOSIS — D46.9 MYELODYSPLASTIC SYNDROME (CMD): ICD-10-CM

## 2021-12-14 LAB
BASOPHILS # BLD: 0 K/MCL (ref 0–0.3)
BASOPHILS NFR BLD: 0 %
DEPRECATED RDW RBC: 46.1 FL (ref 39–50)
EOSINOPHIL # BLD: 0 K/MCL (ref 0–0.5)
EOSINOPHIL NFR BLD: 0 %
ERYTHROCYTE [DISTWIDTH] IN BLOOD: 13.5 % (ref 11–15)
HCT VFR BLD CALC: 38 % (ref 36–46.5)
HGB BLD-MCNC: 11.5 G/DL (ref 12–15.5)
IMM GRANULOCYTES # BLD AUTO: 0 K/MCL (ref 0–0.2)
IMM GRANULOCYTES # BLD: 0 %
LYMPHOCYTES # BLD: 1 K/MCL (ref 1–4)
LYMPHOCYTES NFR BLD: 38 %
MCH RBC QN AUTO: 28 PG (ref 26–34)
MCHC RBC AUTO-ENTMCNC: 30.3 G/DL (ref 32–36.5)
MCV RBC AUTO: 92.5 FL (ref 78–100)
MONOCYTES # BLD: 0.4 K/MCL (ref 0.3–0.9)
MONOCYTES NFR BLD: 15 %
NEUTROPHILS # BLD: 1.2 K/MCL (ref 1.8–7.7)
NEUTROPHILS NFR BLD: 47 %
NRBC BLD MANUAL-RTO: 0 /100 WBC
PLATELET # BLD AUTO: 217 K/MCL (ref 140–450)
RBC # BLD: 4.11 MIL/MCL (ref 4–5.2)
WBC # BLD: 2.6 K/MCL (ref 4.2–11)

## 2021-12-14 PROCEDURE — 91300 COVID 19 PFIZER-BIONTECH: CPT

## 2021-12-14 PROCEDURE — 85025 COMPLETE CBC W/AUTO DIFF WBC: CPT | Performed by: PSYCHIATRY & NEUROLOGY

## 2021-12-14 PROCEDURE — 0004A COVID 19 PFIZER-BIONTECH: CPT

## 2021-12-14 PROCEDURE — 36415 COLL VENOUS BLD VENIPUNCTURE: CPT

## 2021-12-20 RX ORDER — ALENDRONATE SODIUM 70 MG/1
TABLET ORAL
Qty: 12 TABLET | Refills: 3 | Status: SHIPPED | OUTPATIENT
Start: 2021-12-20 | End: 2023-04-24 | Stop reason: ALTCHOICE

## 2021-12-24 ENCOUNTER — TELEPHONE (OUTPATIENT)
Dept: SCHEDULING | Age: 66
End: 2021-12-24

## 2021-12-28 ENCOUNTER — TELEPHONIC VISIT (OUTPATIENT)
Dept: HEMATOLOGY/ONCOLOGY | Age: 66
End: 2021-12-28
Attending: INTERNAL MEDICINE

## 2021-12-28 ENCOUNTER — APPOINTMENT (OUTPATIENT)
Dept: HEMATOLOGY/ONCOLOGY | Age: 66
End: 2021-12-28
Attending: INTERNAL MEDICINE

## 2021-12-28 DIAGNOSIS — D46.9 MYELODYSPLASTIC SYNDROME (CMD): Primary | ICD-10-CM

## 2021-12-28 PROCEDURE — 99214 OFFICE O/P EST MOD 30 MIN: CPT | Performed by: INTERNAL MEDICINE

## 2021-12-28 PROCEDURE — 99211 OFF/OP EST MAY X REQ PHY/QHP: CPT

## 2021-12-28 ASSESSMENT — ENCOUNTER SYMPTOMS
GASTROINTESTINAL NEGATIVE: 1
CONSTITUTIONAL NEGATIVE: 1
ALLERGIC/IMMUNOLOGIC NEGATIVE: 1
RESPIRATORY NEGATIVE: 1
EYES NEGATIVE: 1
ENDOCRINE NEGATIVE: 1
NEUROLOGICAL NEGATIVE: 1
PSYCHIATRIC NEGATIVE: 1
HEMATOLOGIC/LYMPHATIC NEGATIVE: 1

## 2021-12-28 ASSESSMENT — PATIENT HEALTH QUESTIONNAIRE - PHQ9
SUM OF ALL RESPONSES TO PHQ9 QUESTIONS 1 AND 2: 0
2. FEELING DOWN, DEPRESSED OR HOPELESS: NOT AT ALL
SUM OF ALL RESPONSES TO PHQ9 QUESTIONS 1 AND 2: 0
CLINICAL INTERPRETATION OF PHQ2 SCORE: NO FURTHER SCREENING NEEDED
1. LITTLE INTEREST OR PLEASURE IN DOING THINGS: NOT AT ALL

## 2022-02-02 ENCOUNTER — TELEPHONE (OUTPATIENT)
Dept: SCHEDULING | Age: 67
End: 2022-02-02

## 2022-03-08 ENCOUNTER — LAB SERVICES (OUTPATIENT)
Dept: LAB | Age: 67
End: 2022-03-08

## 2022-03-16 ENCOUNTER — OFFICE VISIT (OUTPATIENT)
Dept: INTERNAL MEDICINE | Age: 67
End: 2022-03-16

## 2022-03-16 ENCOUNTER — TELEPHONE (OUTPATIENT)
Dept: SCHEDULING | Age: 67
End: 2022-03-16

## 2022-03-16 VITALS
RESPIRATION RATE: 17 BRPM | TEMPERATURE: 97.7 F | HEIGHT: 67 IN | BODY MASS INDEX: 26.19 KG/M2 | WEIGHT: 166.89 LBS | OXYGEN SATURATION: 100 % | HEART RATE: 70 BPM | DIASTOLIC BLOOD PRESSURE: 67 MMHG | SYSTOLIC BLOOD PRESSURE: 99 MMHG

## 2022-03-16 DIAGNOSIS — D46.9 MYELODYSPLASTIC SYNDROME (CMD): ICD-10-CM

## 2022-03-16 DIAGNOSIS — F20.9 SCHIZOPHRENIA, UNSPECIFIED TYPE (CMD): ICD-10-CM

## 2022-03-16 DIAGNOSIS — E03.9 ACQUIRED HYPOTHYROIDISM: ICD-10-CM

## 2022-03-16 DIAGNOSIS — Z00.00 ANNUAL PHYSICAL EXAM: Primary | ICD-10-CM

## 2022-03-16 DIAGNOSIS — M81.0 AGE-RELATED OSTEOPOROSIS WITHOUT CURRENT PATHOLOGICAL FRACTURE: Primary | ICD-10-CM

## 2022-03-16 DIAGNOSIS — Z00.00 HEALTHCARE MAINTENANCE: ICD-10-CM

## 2022-03-16 DIAGNOSIS — E78.49 OTHER HYPERLIPIDEMIA: ICD-10-CM

## 2022-03-16 DIAGNOSIS — M81.0 AGE-RELATED OSTEOPOROSIS WITHOUT CURRENT PATHOLOGICAL FRACTURE: ICD-10-CM

## 2022-03-16 DIAGNOSIS — H91.90 HEARING DISORDER, UNSPECIFIED LATERALITY: ICD-10-CM

## 2022-03-16 DIAGNOSIS — Z13.1 SCREENING FOR DIABETES MELLITUS (DM): ICD-10-CM

## 2022-03-16 PROCEDURE — 99397 PER PM REEVAL EST PAT 65+ YR: CPT | Performed by: INTERNAL MEDICINE

## 2022-03-16 RX ORDER — LEVOTHYROXINE SODIUM 0.1 MG/1
100 TABLET ORAL DAILY
Qty: 90 TABLET | Refills: 3 | Status: SHIPPED | OUTPATIENT
Start: 2022-03-16 | End: 2022-06-12 | Stop reason: SDUPTHER

## 2022-03-16 ASSESSMENT — PATIENT HEALTH QUESTIONNAIRE - PHQ9
CLINICAL INTERPRETATION OF PHQ2 SCORE: NO FURTHER SCREENING NEEDED
SUM OF ALL RESPONSES TO PHQ9 QUESTIONS 1 AND 2: 0
SUM OF ALL RESPONSES TO PHQ9 QUESTIONS 1 AND 2: 0
2. FEELING DOWN, DEPRESSED OR HOPELESS: NOT AT ALL
1. LITTLE INTEREST OR PLEASURE IN DOING THINGS: NOT AT ALL

## 2022-03-16 ASSESSMENT — PAIN SCALES - GENERAL: PAINLEVEL: 0

## 2022-03-24 ENCOUNTER — OFFICE VISIT (OUTPATIENT)
Dept: ENDOCRINOLOGY | Age: 67
End: 2022-03-24

## 2022-03-24 VITALS
HEIGHT: 67 IN | DIASTOLIC BLOOD PRESSURE: 55 MMHG | BODY MASS INDEX: 25.79 KG/M2 | SYSTOLIC BLOOD PRESSURE: 83 MMHG | WEIGHT: 164.3 LBS | HEART RATE: 97 BPM

## 2022-03-24 DIAGNOSIS — M81.0 AGE-RELATED OSTEOPOROSIS WITHOUT CURRENT PATHOLOGICAL FRACTURE: ICD-10-CM

## 2022-03-24 DIAGNOSIS — E03.9 ACQUIRED HYPOTHYROIDISM: Primary | ICD-10-CM

## 2022-03-24 DIAGNOSIS — F20.9 SCHIZOPHRENIA, UNSPECIFIED TYPE (CMD): ICD-10-CM

## 2022-03-24 DIAGNOSIS — D70.8 OTHER NEUTROPENIA (CMD): ICD-10-CM

## 2022-03-24 PROCEDURE — 99214 OFFICE O/P EST MOD 30 MIN: CPT | Performed by: INTERNAL MEDICINE

## 2022-03-24 ASSESSMENT — PATIENT HEALTH QUESTIONNAIRE - PHQ9
2. FEELING DOWN, DEPRESSED OR HOPELESS: NOT AT ALL
1. LITTLE INTEREST OR PLEASURE IN DOING THINGS: NOT AT ALL
CLINICAL INTERPRETATION OF PHQ2 SCORE: NO FURTHER SCREENING NEEDED
SUM OF ALL RESPONSES TO PHQ9 QUESTIONS 1 AND 2: 0
SUM OF ALL RESPONSES TO PHQ9 QUESTIONS 1 AND 2: 0

## 2022-04-01 ENCOUNTER — LAB SERVICES (OUTPATIENT)
Dept: LAB | Age: 67
End: 2022-04-01

## 2022-04-01 DIAGNOSIS — Z00.00 HEALTHCARE MAINTENANCE: ICD-10-CM

## 2022-04-01 DIAGNOSIS — D46.9 MYELODYSPLASTIC SYNDROME (CMD): ICD-10-CM

## 2022-04-01 DIAGNOSIS — Z13.1 SCREENING FOR DIABETES MELLITUS (DM): ICD-10-CM

## 2022-04-01 DIAGNOSIS — F20.9 SCHIZOPHRENIA, UNSPECIFIED TYPE (CMD): ICD-10-CM

## 2022-04-01 DIAGNOSIS — E78.49 OTHER HYPERLIPIDEMIA: ICD-10-CM

## 2022-04-01 DIAGNOSIS — Z00.00 ANNUAL PHYSICAL EXAM: ICD-10-CM

## 2022-04-01 DIAGNOSIS — M81.0 AGE-RELATED OSTEOPOROSIS WITHOUT CURRENT PATHOLOGICAL FRACTURE: ICD-10-CM

## 2022-04-01 DIAGNOSIS — E03.9 ACQUIRED HYPOTHYROIDISM: ICD-10-CM

## 2022-04-01 LAB
ALBUMIN SERPL-MCNC: 3.6 G/DL (ref 3.6–5.1)
ALBUMIN/GLOB SERPL: 1.3 {RATIO} (ref 1–2.4)
ALP SERPL-CCNC: 78 UNITS/L (ref 45–117)
ALT SERPL-CCNC: 19 UNITS/L
ANION GAP SERPL CALC-SCNC: 7 MMOL/L (ref 10–20)
AST SERPL-CCNC: 18 UNITS/L
BASOPHILS # BLD: 0 K/MCL (ref 0–0.3)
BASOPHILS NFR BLD: 0 %
BILIRUB SERPL-MCNC: 0.4 MG/DL (ref 0.2–1)
BUN SERPL-MCNC: 21 MG/DL (ref 6–20)
BUN/CREAT SERPL: 30 (ref 7–25)
CALCIUM SERPL-MCNC: 9.6 MG/DL (ref 8.4–10.2)
CHLORIDE SERPL-SCNC: 107 MMOL/L (ref 98–107)
CHOLEST SERPL-MCNC: 202 MG/DL
CHOLEST/HDLC SERPL: 2.8 {RATIO}
CO2 SERPL-SCNC: 30 MMOL/L (ref 21–32)
CREAT SERPL-MCNC: 0.7 MG/DL (ref 0.51–0.95)
DEPRECATED RDW RBC: 43 FL (ref 39–50)
EOSINOPHIL # BLD: 0 K/MCL (ref 0–0.5)
EOSINOPHIL NFR BLD: 0 %
ERYTHROCYTE [DISTWIDTH] IN BLOOD: 13.1 % (ref 11–15)
FASTING DURATION TIME PATIENT: ABNORMAL H
FASTING DURATION TIME PATIENT: ABNORMAL H
GFR SERPLBLD BASED ON 1.73 SQ M-ARVRAT: >90 ML/MIN
GLOBULIN SER-MCNC: 2.8 G/DL (ref 2–4)
GLUCOSE SERPL-MCNC: 89 MG/DL (ref 70–99)
HBA1C MFR BLD: 5.6 % (ref 4.5–5.6)
HCT VFR BLD CALC: 38.7 % (ref 36–46.5)
HDLC SERPL-MCNC: 71 MG/DL
HGB BLD-MCNC: 12.2 G/DL (ref 12–15.5)
IMM GRANULOCYTES # BLD AUTO: 0 K/MCL (ref 0–0.2)
IMM GRANULOCYTES # BLD: 0 %
LDLC SERPL CALC-MCNC: 124 MG/DL
LYMPHOCYTES # BLD: 1.1 K/MCL (ref 1–4)
LYMPHOCYTES NFR BLD: 44 %
MCH RBC QN AUTO: 28.2 PG (ref 26–34)
MCHC RBC AUTO-ENTMCNC: 31.5 G/DL (ref 32–36.5)
MCV RBC AUTO: 89.4 FL (ref 78–100)
MONOCYTES # BLD: 0.3 K/MCL (ref 0.3–0.9)
MONOCYTES NFR BLD: 10 %
NEUTROPHILS # BLD: 1.1 K/MCL (ref 1.8–7.7)
NEUTROPHILS NFR BLD: 46 %
NONHDLC SERPL-MCNC: 131 MG/DL
NRBC BLD MANUAL-RTO: 0 /100 WBC
PLATELET # BLD AUTO: 207 K/MCL (ref 140–450)
POTASSIUM SERPL-SCNC: 4.2 MMOL/L (ref 3.4–5.1)
PROT SERPL-MCNC: 6.4 G/DL (ref 6.4–8.2)
RBC # BLD: 4.33 MIL/MCL (ref 4–5.2)
SODIUM SERPL-SCNC: 140 MMOL/L (ref 135–145)
TRIGL SERPL-MCNC: 34 MG/DL
TSH SERPL-ACNC: 2.57 MCUNITS/ML (ref 0.35–5)
WBC # BLD: 2.4 K/MCL (ref 4.2–11)

## 2022-04-01 PROCEDURE — 82523 COLLAGEN CROSSLINKS: CPT | Performed by: INTERNAL MEDICINE

## 2022-04-01 PROCEDURE — 83036 HEMOGLOBIN GLYCOSYLATED A1C: CPT | Performed by: INTERNAL MEDICINE

## 2022-04-01 PROCEDURE — 36415 COLL VENOUS BLD VENIPUNCTURE: CPT | Performed by: INTERNAL MEDICINE

## 2022-04-01 PROCEDURE — 80050 GENERAL HEALTH PANEL: CPT | Performed by: INTERNAL MEDICINE

## 2022-04-01 PROCEDURE — 80061 LIPID PANEL: CPT | Performed by: INTERNAL MEDICINE

## 2022-04-02 ENCOUNTER — TELEPHONE (OUTPATIENT)
Dept: SCHEDULING | Age: 67
End: 2022-04-02

## 2022-04-07 LAB — COLLAGEN CTX SERPL-MCNC: 260 PG/ML

## 2022-04-11 ENCOUNTER — APPOINTMENT (OUTPATIENT)
Dept: HEMATOLOGY/ONCOLOGY | Age: 67
End: 2022-04-11

## 2022-04-12 ENCOUNTER — APPOINTMENT (OUTPATIENT)
Dept: HEMATOLOGY/ONCOLOGY | Age: 67
End: 2022-04-12

## 2022-04-19 ENCOUNTER — APPOINTMENT (OUTPATIENT)
Dept: HEMATOLOGY/ONCOLOGY | Age: 67
End: 2022-04-19

## 2022-04-27 ENCOUNTER — TELEPHONE (OUTPATIENT)
Dept: SCHEDULING | Age: 67
End: 2022-04-27

## 2022-05-02 ENCOUNTER — TELEPHONE (OUTPATIENT)
Dept: SCHEDULING | Age: 67
End: 2022-05-02

## 2022-05-03 ENCOUNTER — TELEPHONE (OUTPATIENT)
Dept: SCHEDULING | Age: 67
End: 2022-05-03

## 2022-05-05 ENCOUNTER — TELEPHONE (OUTPATIENT)
Dept: SCHEDULING | Age: 67
End: 2022-05-05

## 2022-05-05 ENCOUNTER — LAB SERVICES (OUTPATIENT)
Dept: LAB | Age: 67
End: 2022-05-05

## 2022-05-05 ENCOUNTER — OFFICE VISIT (OUTPATIENT)
Dept: HEMATOLOGY/ONCOLOGY | Age: 67
End: 2022-05-05
Attending: INTERNAL MEDICINE

## 2022-05-05 VITALS
SYSTOLIC BLOOD PRESSURE: 103 MMHG | WEIGHT: 164.9 LBS | BODY MASS INDEX: 24.99 KG/M2 | HEIGHT: 68 IN | HEART RATE: 64 BPM | OXYGEN SATURATION: 100 % | DIASTOLIC BLOOD PRESSURE: 69 MMHG | TEMPERATURE: 98.1 F | RESPIRATION RATE: 18 BRPM

## 2022-05-05 DIAGNOSIS — M81.0 OSTEOPOROSIS, UNSPECIFIED OSTEOPOROSIS TYPE, UNSPECIFIED PATHOLOGICAL FRACTURE PRESENCE: ICD-10-CM

## 2022-05-05 DIAGNOSIS — M81.0 OSTEOPOROSIS, UNSPECIFIED OSTEOPOROSIS TYPE, UNSPECIFIED PATHOLOGICAL FRACTURE PRESENCE: Primary | ICD-10-CM

## 2022-05-05 DIAGNOSIS — D46.9 MYELODYSPLASTIC SYNDROME (CMD): ICD-10-CM

## 2022-05-05 PROCEDURE — 99214 OFFICE O/P EST MOD 30 MIN: CPT | Performed by: INTERNAL MEDICINE

## 2022-05-05 PROCEDURE — 99211 OFF/OP EST MAY X REQ PHY/QHP: CPT

## 2022-05-05 PROCEDURE — 82523 COLLAGEN CROSSLINKS: CPT | Performed by: INTERNAL MEDICINE

## 2022-05-05 PROCEDURE — 36415 COLL VENOUS BLD VENIPUNCTURE: CPT | Performed by: INTERNAL MEDICINE

## 2022-05-05 ASSESSMENT — PATIENT HEALTH QUESTIONNAIRE - PHQ9
CLINICAL INTERPRETATION OF PHQ2 SCORE: NO FURTHER SCREENING NEEDED
1. LITTLE INTEREST OR PLEASURE IN DOING THINGS: NOT AT ALL
2. FEELING DOWN, DEPRESSED OR HOPELESS: NOT AT ALL
SUM OF ALL RESPONSES TO PHQ9 QUESTIONS 1 AND 2: 0
SUM OF ALL RESPONSES TO PHQ9 QUESTIONS 1 AND 2: 0

## 2022-05-05 ASSESSMENT — ENCOUNTER SYMPTOMS
NEUROLOGICAL NEGATIVE: 1
EYES NEGATIVE: 1
GASTROINTESTINAL NEGATIVE: 1
CONSTITUTIONAL NEGATIVE: 1
PSYCHIATRIC NEGATIVE: 1
ENDOCRINE NEGATIVE: 1
HEMATOLOGIC/LYMPHATIC NEGATIVE: 1
ALLERGIC/IMMUNOLOGIC NEGATIVE: 1
RESPIRATORY NEGATIVE: 1

## 2022-05-05 ASSESSMENT — PAIN SCALES - GENERAL: PAINLEVEL: 0

## 2022-05-08 LAB — COLLAGEN CTX SERPL-MCNC: 443 PG/ML

## 2022-05-13 ENCOUNTER — IMMUNIZATION (OUTPATIENT)
Dept: INTERNAL MEDICINE | Age: 67
End: 2022-05-13

## 2022-05-13 DIAGNOSIS — Z23 NEED FOR VACCINATION: Primary | ICD-10-CM

## 2022-05-13 PROCEDURE — 91305 COVID 19 12Y AND OLDER PFIZER-BIONTECH - DO NOT DILUTE: CPT | Performed by: GENERAL PRACTICE

## 2022-05-13 PROCEDURE — 0054A COVID 19 12Y AND OLDER PFIZER-BIONTECH - DO NOT DILUTE: CPT | Performed by: GENERAL PRACTICE

## 2022-05-31 ENCOUNTER — CLINICAL ABSTRACT (OUTPATIENT)
Dept: INTERNAL MEDICINE | Age: 67
End: 2022-05-31

## 2022-06-13 RX ORDER — LEVOTHYROXINE SODIUM 0.1 MG/1
100 TABLET ORAL DAILY
Qty: 90 TABLET | Refills: 3 | Status: SHIPPED | OUTPATIENT
Start: 2022-06-13 | End: 2023-04-24 | Stop reason: SDUPTHER

## 2022-06-15 ENCOUNTER — TELEPHONE (OUTPATIENT)
Dept: SCHEDULING | Age: 67
End: 2022-06-15

## 2022-07-12 ENCOUNTER — APPOINTMENT (OUTPATIENT)
Dept: INTERNAL MEDICINE | Age: 67
End: 2022-07-12

## 2022-07-12 ENCOUNTER — LAB SERVICES (OUTPATIENT)
Dept: LAB | Age: 67
End: 2022-07-12

## 2022-07-12 ENCOUNTER — OFFICE VISIT (OUTPATIENT)
Dept: INTERNAL MEDICINE | Age: 67
End: 2022-07-12

## 2022-07-12 VITALS
HEIGHT: 67 IN | OXYGEN SATURATION: 99 % | RESPIRATION RATE: 20 BRPM | BODY MASS INDEX: 25.38 KG/M2 | HEART RATE: 84 BPM | TEMPERATURE: 99.4 F | WEIGHT: 161.71 LBS | SYSTOLIC BLOOD PRESSURE: 96 MMHG | DIASTOLIC BLOOD PRESSURE: 64 MMHG

## 2022-07-12 DIAGNOSIS — E78.49 OTHER HYPERLIPIDEMIA: ICD-10-CM

## 2022-07-12 DIAGNOSIS — M81.0 AGE-RELATED OSTEOPOROSIS WITHOUT CURRENT PATHOLOGICAL FRACTURE: ICD-10-CM

## 2022-07-12 DIAGNOSIS — K08.9 POOR DENTITION: ICD-10-CM

## 2022-07-12 DIAGNOSIS — E03.9 ACQUIRED HYPOTHYROIDISM: Primary | ICD-10-CM

## 2022-07-12 DIAGNOSIS — F20.9 SCHIZOPHRENIA, UNSPECIFIED TYPE (CMD): ICD-10-CM

## 2022-07-12 DIAGNOSIS — D46.9 MYELODYSPLASTIC SYNDROME (CMD): ICD-10-CM

## 2022-07-12 PROCEDURE — 36415 COLL VENOUS BLD VENIPUNCTURE: CPT | Performed by: INTERNAL MEDICINE

## 2022-07-12 PROCEDURE — 80061 LIPID PANEL: CPT | Performed by: INTERNAL MEDICINE

## 2022-07-12 PROCEDURE — 99214 OFFICE O/P EST MOD 30 MIN: CPT | Performed by: INTERNAL MEDICINE

## 2022-07-12 RX ORDER — RISPERIDONE 1 MG/1
1 TABLET ORAL 2 TIMES DAILY
COMMUNITY
Start: 2022-05-15

## 2022-07-12 ASSESSMENT — PATIENT HEALTH QUESTIONNAIRE - PHQ9
1. LITTLE INTEREST OR PLEASURE IN DOING THINGS: NOT AT ALL
SUM OF ALL RESPONSES TO PHQ9 QUESTIONS 1 AND 2: 0
CLINICAL INTERPRETATION OF PHQ2 SCORE: NO FURTHER SCREENING NEEDED
SUM OF ALL RESPONSES TO PHQ9 QUESTIONS 1 AND 2: 0
2. FEELING DOWN, DEPRESSED OR HOPELESS: NOT AT ALL

## 2022-07-12 ASSESSMENT — PAIN SCALES - GENERAL: PAINLEVEL: 0

## 2022-07-13 LAB
CHOLEST SERPL-MCNC: 190 MG/DL
CHOLEST/HDLC SERPL: 2.8 {RATIO}
FASTING DURATION TIME PATIENT: NORMAL H
HDLC SERPL-MCNC: 68 MG/DL
LDLC SERPL CALC-MCNC: 113 MG/DL
NONHDLC SERPL-MCNC: 122 MG/DL
TRIGL SERPL-MCNC: 44 MG/DL

## 2022-08-23 ENCOUNTER — TELEPHONE (OUTPATIENT)
Dept: SCHEDULING | Age: 67
End: 2022-08-23

## 2022-08-23 DIAGNOSIS — M72.2 PLANTAR FASCIITIS: Primary | ICD-10-CM

## 2022-08-31 ENCOUNTER — APPOINTMENT (OUTPATIENT)
Dept: URBAN - METROPOLITAN AREA CLINIC 247 | Age: 67
Setting detail: DERMATOLOGY
End: 2022-08-31

## 2022-08-31 DIAGNOSIS — L82.1 OTHER SEBORRHEIC KERATOSIS: ICD-10-CM

## 2022-08-31 PROCEDURE — OTHER COUNSELING: OTHER

## 2022-08-31 PROCEDURE — 99212 OFFICE O/P EST SF 10 MIN: CPT

## 2022-08-31 ASSESSMENT — LOCATION SIMPLE DESCRIPTION DERM
LOCATION SIMPLE: RIGHT ZYGOMA
LOCATION SIMPLE: RIGHT TEMPLE

## 2022-08-31 ASSESSMENT — LOCATION DETAILED DESCRIPTION DERM
LOCATION DETAILED: RIGHT CENTRAL ZYGOMA
LOCATION DETAILED: RIGHT CENTRAL TEMPLE

## 2022-08-31 ASSESSMENT — LOCATION ZONE DERM: LOCATION ZONE: FACE

## 2022-08-31 NOTE — HPI: SKIN LESION
What Type Of Note Output Would You Prefer (Optional)?: Bullet Format
Is This A New Presentation, Or A Follow-Up?: Follow Up Skin Lesion
Additional History: Patient declined a skin exam today.

## 2022-10-28 ENCOUNTER — LAB SERVICES (OUTPATIENT)
Dept: LAB | Age: 67
End: 2022-10-28

## 2022-10-28 DIAGNOSIS — D46.9 MYELODYSPLASTIC SYNDROME (CMD): ICD-10-CM

## 2022-10-28 PROCEDURE — 85025 COMPLETE CBC W/AUTO DIFF WBC: CPT | Performed by: INTERNAL MEDICINE

## 2022-10-28 PROCEDURE — 36415 COLL VENOUS BLD VENIPUNCTURE: CPT | Performed by: INTERNAL MEDICINE

## 2022-10-29 LAB
BASOPHILS # BLD: 0 K/MCL (ref 0–0.3)
BASOPHILS NFR BLD: 0 %
DEPRECATED RDW RBC: 46.9 FL (ref 39–50)
EOSINOPHIL # BLD: 0 K/MCL (ref 0–0.5)
EOSINOPHIL NFR BLD: 0 %
ERYTHROCYTE [DISTWIDTH] IN BLOOD: 13.7 % (ref 11–15)
HCT VFR BLD CALC: 37.1 % (ref 36–46.5)
HGB BLD-MCNC: 11.6 G/DL (ref 12–15.5)
IMM GRANULOCYTES # BLD AUTO: 0 K/MCL (ref 0–0.2)
IMM GRANULOCYTES # BLD: 0 %
LYMPHOCYTES # BLD: 1.1 K/MCL (ref 1–4)
LYMPHOCYTES NFR BLD: 41 %
MCH RBC QN AUTO: 28.8 PG (ref 26–34)
MCHC RBC AUTO-ENTMCNC: 31.3 G/DL (ref 32–36.5)
MCV RBC AUTO: 92.1 FL (ref 78–100)
MONOCYTES # BLD: 0.3 K/MCL (ref 0.3–0.9)
MONOCYTES NFR BLD: 12 %
NEUTROPHILS # BLD: 1.2 K/MCL (ref 1.8–7.7)
NEUTROPHILS NFR BLD: 47 %
NRBC BLD MANUAL-RTO: 0 /100 WBC
PLATELET # BLD AUTO: 200 K/MCL (ref 140–450)
RBC # BLD: 4.03 MIL/MCL (ref 4–5.2)
WBC # BLD: 2.6 K/MCL (ref 4.2–11)

## 2022-11-07 ENCOUNTER — OFFICE VISIT (OUTPATIENT)
Dept: HEMATOLOGY/ONCOLOGY | Age: 67
End: 2022-11-07
Attending: INTERNAL MEDICINE

## 2022-11-07 VITALS
RESPIRATION RATE: 18 BRPM | OXYGEN SATURATION: 99 % | TEMPERATURE: 98.2 F | SYSTOLIC BLOOD PRESSURE: 108 MMHG | WEIGHT: 162.92 LBS | BODY MASS INDEX: 24.69 KG/M2 | HEIGHT: 68 IN | HEART RATE: 59 BPM | DIASTOLIC BLOOD PRESSURE: 73 MMHG

## 2022-11-07 DIAGNOSIS — D46.9 MYELODYSPLASTIC SYNDROME (CMD): Primary | ICD-10-CM

## 2022-11-07 PROCEDURE — 99214 OFFICE O/P EST MOD 30 MIN: CPT | Performed by: INTERNAL MEDICINE

## 2022-11-07 PROCEDURE — 99211 OFF/OP EST MAY X REQ PHY/QHP: CPT

## 2022-11-07 ASSESSMENT — ENCOUNTER SYMPTOMS
GASTROINTESTINAL NEGATIVE: 1
PSYCHIATRIC NEGATIVE: 1
NEUROLOGICAL NEGATIVE: 1
HEMATOLOGIC/LYMPHATIC NEGATIVE: 1
CONSTITUTIONAL NEGATIVE: 1
ALLERGIC/IMMUNOLOGIC NEGATIVE: 1
RESPIRATORY NEGATIVE: 1
ENDOCRINE NEGATIVE: 1
EYES NEGATIVE: 1

## 2022-11-07 ASSESSMENT — PATIENT HEALTH QUESTIONNAIRE - PHQ9
SUM OF ALL RESPONSES TO PHQ9 QUESTIONS 1 AND 2: 0
1. LITTLE INTEREST OR PLEASURE IN DOING THINGS: NOT AT ALL
CLINICAL INTERPRETATION OF PHQ2 SCORE: NO FURTHER SCREENING NEEDED
SUM OF ALL RESPONSES TO PHQ9 QUESTIONS 1 AND 2: 0
2. FEELING DOWN, DEPRESSED OR HOPELESS: NOT AT ALL

## 2022-11-07 ASSESSMENT — PAIN SCALES - GENERAL: PAINLEVEL: 0

## 2022-11-14 ENCOUNTER — APPOINTMENT (OUTPATIENT)
Dept: INTERNAL MEDICINE | Age: 67
End: 2022-11-14

## 2022-11-15 ENCOUNTER — OFFICE VISIT (OUTPATIENT)
Dept: INTERNAL MEDICINE | Age: 67
End: 2022-11-15

## 2022-11-15 VITALS
HEART RATE: 73 BPM | DIASTOLIC BLOOD PRESSURE: 59 MMHG | OXYGEN SATURATION: 98 % | BODY MASS INDEX: 25.57 KG/M2 | TEMPERATURE: 97.6 F | WEIGHT: 162.92 LBS | RESPIRATION RATE: 16 BRPM | HEIGHT: 67 IN | SYSTOLIC BLOOD PRESSURE: 100 MMHG

## 2022-11-15 DIAGNOSIS — D46.9 MYELODYSPLASTIC SYNDROME (CMD): ICD-10-CM

## 2022-11-15 DIAGNOSIS — F20.9 SCHIZOPHRENIA, UNSPECIFIED TYPE (CMD): ICD-10-CM

## 2022-11-15 DIAGNOSIS — Z12.11 COLON CANCER SCREENING: ICD-10-CM

## 2022-11-15 DIAGNOSIS — E78.2 MIXED DYSLIPIDEMIA: Primary | ICD-10-CM

## 2022-11-15 DIAGNOSIS — Z12.31 VISIT FOR SCREENING MAMMOGRAM: ICD-10-CM

## 2022-11-15 DIAGNOSIS — E03.9 ACQUIRED HYPOTHYROIDISM: ICD-10-CM

## 2022-11-15 DIAGNOSIS — M81.0 AGE-RELATED OSTEOPOROSIS WITHOUT CURRENT PATHOLOGICAL FRACTURE: ICD-10-CM

## 2022-11-15 DIAGNOSIS — Z00.00 HEALTHCARE MAINTENANCE: ICD-10-CM

## 2022-11-15 PROCEDURE — 99214 OFFICE O/P EST MOD 30 MIN: CPT | Performed by: INTERNAL MEDICINE

## 2022-11-15 PROCEDURE — 90662 IIV NO PRSV INCREASED AG IM: CPT | Performed by: INTERNAL MEDICINE

## 2022-11-15 PROCEDURE — 90471 IMMUNIZATION ADMIN: CPT | Performed by: INTERNAL MEDICINE

## 2022-11-15 ASSESSMENT — PATIENT HEALTH QUESTIONNAIRE - PHQ9
SUM OF ALL RESPONSES TO PHQ9 QUESTIONS 1 AND 2: 0
2. FEELING DOWN, DEPRESSED OR HOPELESS: NOT AT ALL
1. LITTLE INTEREST OR PLEASURE IN DOING THINGS: NOT AT ALL
SUM OF ALL RESPONSES TO PHQ9 QUESTIONS 1 AND 2: 0
CLINICAL INTERPRETATION OF PHQ2 SCORE: NO FURTHER SCREENING NEEDED

## 2022-11-15 ASSESSMENT — PAIN SCALES - GENERAL: PAINLEVEL: 0

## 2022-11-23 ENCOUNTER — IMAGING SERVICES (OUTPATIENT)
Dept: MAMMOGRAPHY | Age: 67
End: 2022-11-23

## 2022-11-23 DIAGNOSIS — Z12.31 VISIT FOR SCREENING MAMMOGRAM: ICD-10-CM

## 2022-11-23 PROCEDURE — 77067 SCR MAMMO BI INCL CAD: CPT | Performed by: INTERNAL MEDICINE

## 2022-11-23 PROCEDURE — 77063 BREAST TOMOSYNTHESIS BI: CPT | Performed by: INTERNAL MEDICINE

## 2023-03-29 ENCOUNTER — OFFICE VISIT (OUTPATIENT)
Dept: ENDOCRINOLOGY | Age: 68
End: 2023-03-29

## 2023-03-29 VITALS
WEIGHT: 171.52 LBS | BODY MASS INDEX: 26.92 KG/M2 | HEART RATE: 70 BPM | DIASTOLIC BLOOD PRESSURE: 64 MMHG | HEIGHT: 67 IN | SYSTOLIC BLOOD PRESSURE: 101 MMHG

## 2023-03-29 DIAGNOSIS — M81.0 AGE-RELATED OSTEOPOROSIS WITHOUT CURRENT PATHOLOGICAL FRACTURE: ICD-10-CM

## 2023-03-29 DIAGNOSIS — E03.9 ACQUIRED HYPOTHYROIDISM: Primary | ICD-10-CM

## 2023-03-29 DIAGNOSIS — E66.3 OVERWEIGHT (BMI 25.0-29.9): ICD-10-CM

## 2023-03-29 DIAGNOSIS — D70.8 OTHER NEUTROPENIA (CMD): ICD-10-CM

## 2023-03-29 PROCEDURE — 99214 OFFICE O/P EST MOD 30 MIN: CPT | Performed by: INTERNAL MEDICINE

## 2023-03-29 ASSESSMENT — PATIENT HEALTH QUESTIONNAIRE - PHQ9
1. LITTLE INTEREST OR PLEASURE IN DOING THINGS: NOT AT ALL
SUM OF ALL RESPONSES TO PHQ9 QUESTIONS 1 AND 2: 0
CLINICAL INTERPRETATION OF PHQ2 SCORE: NO FURTHER SCREENING NEEDED
2. FEELING DOWN, DEPRESSED OR HOPELESS: NOT AT ALL
SUM OF ALL RESPONSES TO PHQ9 QUESTIONS 1 AND 2: 0

## 2023-04-14 ENCOUNTER — LAB SERVICES (OUTPATIENT)
Dept: LAB | Age: 68
End: 2023-04-14

## 2023-04-14 DIAGNOSIS — E03.9 ACQUIRED HYPOTHYROIDISM: ICD-10-CM

## 2023-04-14 DIAGNOSIS — E66.3 OVERWEIGHT (BMI 25.0-29.9): ICD-10-CM

## 2023-04-14 LAB
HBA1C MFR BLD: 5.5 % (ref 4.5–5.6)
T4 FREE SERPL-MCNC: 1 NG/DL (ref 0.8–1.5)
TSH SERPL-ACNC: 1.02 MCUNITS/ML (ref 0.35–5)

## 2023-04-14 PROCEDURE — 84439 ASSAY OF FREE THYROXINE: CPT | Performed by: INTERNAL MEDICINE

## 2023-04-14 PROCEDURE — 36415 COLL VENOUS BLD VENIPUNCTURE: CPT | Performed by: INTERNAL MEDICINE

## 2023-04-14 PROCEDURE — 84443 ASSAY THYROID STIM HORMONE: CPT | Performed by: INTERNAL MEDICINE

## 2023-04-14 PROCEDURE — 83036 HEMOGLOBIN GLYCOSYLATED A1C: CPT | Performed by: INTERNAL MEDICINE

## 2023-04-23 ENCOUNTER — APPOINTMENT (OUTPATIENT)
Dept: URGENT CARE | Age: 68
End: 2023-04-23

## 2023-04-23 DIAGNOSIS — Z11.52 ENCOUNTER FOR PREPROCEDURE SCREENING LABORATORY TESTING FOR COVID-19: Primary | ICD-10-CM

## 2023-04-23 DIAGNOSIS — Z01.812 ENCOUNTER FOR PREPROCEDURE SCREENING LABORATORY TESTING FOR COVID-19: Primary | ICD-10-CM

## 2023-04-23 PROCEDURE — U0005 INFEC AGEN DETEC AMPLI PROBE: HCPCS | Performed by: INTERNAL MEDICINE

## 2023-04-23 PROCEDURE — U0003 INFECTIOUS AGENT DETECTION BY NUCLEIC ACID (DNA OR RNA); SEVERE ACUTE RESPIRATORY SYNDROME CORONAVIRUS 2 (SARS-COV-2) (CORONAVIRUS DISEASE [COVID-19]), AMPLIFIED PROBE TECHNIQUE, MAKING USE OF HIGH THROUGHPUT TECHNOLOGIES AS DESCRIBED BY CMS-2020-01-R: HCPCS | Performed by: INTERNAL MEDICINE

## 2023-04-24 ENCOUNTER — OFFICE VISIT (OUTPATIENT)
Dept: INTERNAL MEDICINE | Age: 68
End: 2023-04-24

## 2023-04-24 VITALS
RESPIRATION RATE: 16 BRPM | OXYGEN SATURATION: 100 % | HEART RATE: 69 BPM | DIASTOLIC BLOOD PRESSURE: 76 MMHG | BODY MASS INDEX: 27.02 KG/M2 | HEIGHT: 67 IN | SYSTOLIC BLOOD PRESSURE: 116 MMHG | WEIGHT: 172.18 LBS | TEMPERATURE: 98.2 F

## 2023-04-24 DIAGNOSIS — E03.9 ACQUIRED HYPOTHYROIDISM: ICD-10-CM

## 2023-04-24 DIAGNOSIS — H61.20 EXCESSIVE CERUMEN IN EAR CANAL, UNSPECIFIED LATERALITY: ICD-10-CM

## 2023-04-24 DIAGNOSIS — D46.9 MYELODYSPLASTIC SYNDROME (CMD): ICD-10-CM

## 2023-04-24 DIAGNOSIS — Z00.00 HEALTHCARE MAINTENANCE: ICD-10-CM

## 2023-04-24 DIAGNOSIS — E78.2 MIXED DYSLIPIDEMIA: Primary | ICD-10-CM

## 2023-04-24 DIAGNOSIS — F20.9 SCHIZOPHRENIA, UNSPECIFIED TYPE (CMD): ICD-10-CM

## 2023-04-24 DIAGNOSIS — E66.3 OVERWEIGHT (BMI 25.0-29.9): ICD-10-CM

## 2023-04-24 DIAGNOSIS — M81.0 AGE-RELATED OSTEOPOROSIS WITHOUT CURRENT PATHOLOGICAL FRACTURE: ICD-10-CM

## 2023-04-24 LAB
SARS-COV-2 RNA RESP QL NAA+PROBE: NOT DETECTED
SERVICE CMNT-IMP: NORMAL
SERVICE CMNT-IMP: NORMAL

## 2023-04-24 PROCEDURE — 99214 OFFICE O/P EST MOD 30 MIN: CPT | Performed by: INTERNAL MEDICINE

## 2023-04-24 RX ORDER — OYSTER SHELL CALCIUM WITH VITAMIN D 500; 200 MG/1; [IU]/1
1 TABLET, FILM COATED ORAL 2 TIMES DAILY
Qty: 180 TABLET | Refills: 1 | Status: SHIPPED | OUTPATIENT
Start: 2023-04-24

## 2023-04-24 RX ORDER — LEVOTHYROXINE SODIUM 0.1 MG/1
100 TABLET ORAL DAILY
Qty: 90 TABLET | Refills: 1 | Status: SHIPPED | OUTPATIENT
Start: 2023-04-24

## 2023-04-24 ASSESSMENT — PAIN SCALES - GENERAL: PAINLEVEL: 0

## 2023-05-02 ENCOUNTER — OFFICE VISIT (OUTPATIENT)
Dept: HEMATOLOGY/ONCOLOGY | Age: 68
End: 2023-05-02
Attending: INTERNAL MEDICINE

## 2023-05-02 VITALS
BODY MASS INDEX: 26.46 KG/M2 | DIASTOLIC BLOOD PRESSURE: 65 MMHG | WEIGHT: 174.6 LBS | SYSTOLIC BLOOD PRESSURE: 99 MMHG | OXYGEN SATURATION: 99 % | TEMPERATURE: 98.1 F | HEART RATE: 65 BPM | HEIGHT: 68 IN

## 2023-05-02 DIAGNOSIS — D46.9 MYELODYSPLASTIC SYNDROME (CMD): ICD-10-CM

## 2023-05-02 LAB
BASOPHILS # BLD: 0 K/MCL (ref 0–0.3)
BASOPHILS NFR BLD: 0 %
DEPRECATED RDW RBC: 41.4 FL (ref 39–50)
EOSINOPHIL # BLD: 0 K/MCL (ref 0–0.5)
EOSINOPHIL NFR BLD: 0 %
ERYTHROCYTE [DISTWIDTH] IN BLOOD: 13.2 % (ref 11–15)
HCT VFR BLD CALC: 38.4 % (ref 36–46.5)
HGB BLD-MCNC: 12.2 G/DL (ref 12–15.5)
IMM GRANULOCYTES # BLD AUTO: 0 K/MCL (ref 0–0.2)
IMM GRANULOCYTES # BLD: 0 %
LYMPHOCYTES # BLD: 1.1 K/MCL (ref 1–4)
LYMPHOCYTES NFR BLD: 43 %
MCH RBC QN AUTO: 27.6 PG (ref 26–34)
MCHC RBC AUTO-ENTMCNC: 31.8 G/DL (ref 32–36.5)
MCV RBC AUTO: 86.9 FL (ref 78–100)
MONOCYTES # BLD: 0.2 K/MCL (ref 0.3–0.9)
MONOCYTES NFR BLD: 9 %
NEUTROPHILS # BLD: 1.2 K/MCL (ref 1.8–7.7)
NEUTROPHILS NFR BLD: 48 %
NRBC BLD MANUAL-RTO: 0 /100 WBC
PLATELET # BLD AUTO: 197 K/MCL (ref 140–450)
RBC # BLD: 4.42 MIL/MCL (ref 4–5.2)
WBC # BLD: 2.5 K/MCL (ref 4.2–11)

## 2023-05-02 PROCEDURE — G0463 HOSPITAL OUTPT CLINIC VISIT: HCPCS

## 2023-05-02 PROCEDURE — 99214 OFFICE O/P EST MOD 30 MIN: CPT | Performed by: INTERNAL MEDICINE

## 2023-05-02 PROCEDURE — 85025 COMPLETE CBC W/AUTO DIFF WBC: CPT | Performed by: INTERNAL MEDICINE

## 2023-05-02 PROCEDURE — 99211 OFF/OP EST MAY X REQ PHY/QHP: CPT

## 2023-05-02 ASSESSMENT — ENCOUNTER SYMPTOMS
CONSTITUTIONAL NEGATIVE: 1
NEUROLOGICAL NEGATIVE: 1
EYES NEGATIVE: 1
GASTROINTESTINAL NEGATIVE: 1
ENDOCRINE NEGATIVE: 1
PSYCHIATRIC NEGATIVE: 1
HEMATOLOGIC/LYMPHATIC NEGATIVE: 1
ALLERGIC/IMMUNOLOGIC NEGATIVE: 1
RESPIRATORY NEGATIVE: 1

## 2023-05-02 ASSESSMENT — PAIN SCALES - GENERAL: PAINLEVEL: 0

## 2023-06-13 ENCOUNTER — APPOINTMENT (OUTPATIENT)
Dept: INTERNAL MEDICINE | Age: 68
End: 2023-06-13

## 2023-09-20 ENCOUNTER — APPOINTMENT (OUTPATIENT)
Dept: ENDOCRINOLOGY | Age: 68
End: 2023-09-20

## 2023-09-26 ENCOUNTER — TELEPHONE (OUTPATIENT)
Dept: HEMATOLOGY/ONCOLOGY | Age: 68
End: 2023-09-26

## 2023-09-26 DIAGNOSIS — D46.9 MYELODYSPLASTIC SYNDROME (CMD): Primary | ICD-10-CM

## 2023-09-27 ENCOUNTER — LAB SERVICES (OUTPATIENT)
Dept: LAB | Age: 68
End: 2023-09-27

## 2023-09-27 DIAGNOSIS — E78.2 MIXED DYSLIPIDEMIA: ICD-10-CM

## 2023-09-27 DIAGNOSIS — D46.9 MYELODYSPLASTIC SYNDROME (CMD): ICD-10-CM

## 2023-09-27 LAB
BASOPHILS # BLD: 0 K/MCL (ref 0–0.3)
BASOPHILS NFR BLD: 0 %
CHOLEST SERPL-MCNC: 224 MG/DL
CHOLEST/HDLC SERPL: 2.9 {RATIO}
DEPRECATED RDW RBC: 42.8 FL (ref 39–50)
EOSINOPHIL # BLD: 0 K/MCL (ref 0–0.5)
EOSINOPHIL NFR BLD: 0 %
ERYTHROCYTE [DISTWIDTH] IN BLOOD: 13.1 % (ref 11–15)
HCT VFR BLD CALC: 37.2 % (ref 36–46.5)
HDLC SERPL-MCNC: 76 MG/DL
HGB BLD-MCNC: 11.8 G/DL (ref 12–15.5)
IMM GRANULOCYTES # BLD AUTO: 0 K/MCL (ref 0–0.2)
IMM GRANULOCYTES # BLD: 0 %
LDLC SERPL CALC-MCNC: 137 MG/DL
LYMPHOCYTES # BLD: 1.2 K/MCL (ref 1–4)
LYMPHOCYTES NFR BLD: 42 %
MCH RBC QN AUTO: 28.2 PG (ref 26–34)
MCHC RBC AUTO-ENTMCNC: 31.7 G/DL (ref 32–36.5)
MCV RBC AUTO: 89 FL (ref 78–100)
MONOCYTES # BLD: 0.4 K/MCL (ref 0.3–0.9)
MONOCYTES NFR BLD: 12 %
NEUTROPHILS # BLD: 1.3 K/MCL (ref 1.8–7.7)
NEUTROPHILS NFR BLD: 46 %
NONHDLC SERPL-MCNC: 148 MG/DL
NRBC BLD MANUAL-RTO: 0 /100 WBC
PLATELET # BLD AUTO: 198 K/MCL (ref 140–450)
RBC # BLD: 4.18 MIL/MCL (ref 4–5.2)
TRIGL SERPL-MCNC: 56 MG/DL
WBC # BLD: 2.9 K/MCL (ref 4.2–11)

## 2023-09-27 PROCEDURE — 85025 COMPLETE CBC W/AUTO DIFF WBC: CPT | Performed by: INTERNAL MEDICINE

## 2023-09-27 PROCEDURE — 80061 LIPID PANEL: CPT | Performed by: INTERNAL MEDICINE

## 2023-09-27 PROCEDURE — 36415 COLL VENOUS BLD VENIPUNCTURE: CPT | Performed by: INTERNAL MEDICINE

## 2023-10-03 ENCOUNTER — OFFICE VISIT (OUTPATIENT)
Dept: HEMATOLOGY/ONCOLOGY | Age: 68
End: 2023-10-03
Attending: INTERNAL MEDICINE

## 2023-10-03 VITALS
OXYGEN SATURATION: 100 % | DIASTOLIC BLOOD PRESSURE: 70 MMHG | WEIGHT: 183.2 LBS | SYSTOLIC BLOOD PRESSURE: 98 MMHG | TEMPERATURE: 98.4 F | BODY MASS INDEX: 27.77 KG/M2 | RESPIRATION RATE: 18 BRPM | HEIGHT: 68 IN | HEART RATE: 77 BPM

## 2023-10-03 DIAGNOSIS — D46.9 MYELODYSPLASTIC SYNDROME (CMD): Primary | ICD-10-CM

## 2023-10-03 PROCEDURE — 99214 OFFICE O/P EST MOD 30 MIN: CPT | Performed by: INTERNAL MEDICINE

## 2023-10-03 PROCEDURE — G0463 HOSPITAL OUTPT CLINIC VISIT: HCPCS

## 2023-10-03 PROCEDURE — 99211 OFF/OP EST MAY X REQ PHY/QHP: CPT

## 2023-10-03 ASSESSMENT — ENCOUNTER SYMPTOMS
GASTROINTESTINAL NEGATIVE: 1
EYES NEGATIVE: 1
ENDOCRINE NEGATIVE: 1
RESPIRATORY NEGATIVE: 1
CONSTITUTIONAL NEGATIVE: 1
ALLERGIC/IMMUNOLOGIC NEGATIVE: 1
HEMATOLOGIC/LYMPHATIC NEGATIVE: 1
PSYCHIATRIC NEGATIVE: 1
NEUROLOGICAL NEGATIVE: 1

## 2023-10-03 ASSESSMENT — PATIENT HEALTH QUESTIONNAIRE - PHQ9
SUM OF ALL RESPONSES TO PHQ9 QUESTIONS 1 AND 2: 0
1. LITTLE INTEREST OR PLEASURE IN DOING THINGS: NOT AT ALL
SUM OF ALL RESPONSES TO PHQ9 QUESTIONS 1 AND 2: 0
CLINICAL INTERPRETATION OF PHQ2 SCORE: NO FURTHER SCREENING NEEDED
2. FEELING DOWN, DEPRESSED OR HOPELESS: NOT AT ALL

## 2023-10-03 ASSESSMENT — PAIN SCALES - GENERAL: PAINLEVEL: 6

## 2023-10-06 ENCOUNTER — E-ADVICE (OUTPATIENT)
Dept: INTERNAL MEDICINE | Age: 68
End: 2023-10-06

## 2023-10-20 ENCOUNTER — APPOINTMENT (OUTPATIENT)
Dept: LAB | Age: 68
End: 2023-10-20

## 2023-11-01 ENCOUNTER — APPOINTMENT (OUTPATIENT)
Dept: HEMATOLOGY/ONCOLOGY | Age: 68
End: 2023-11-01
Attending: INTERNAL MEDICINE

## 2023-11-02 ENCOUNTER — OFFICE VISIT (OUTPATIENT)
Dept: INTERNAL MEDICINE | Age: 68
End: 2023-11-02

## 2023-11-02 VITALS
RESPIRATION RATE: 16 BRPM | HEART RATE: 57 BPM | DIASTOLIC BLOOD PRESSURE: 71 MMHG | SYSTOLIC BLOOD PRESSURE: 110 MMHG | HEIGHT: 67 IN | WEIGHT: 182.54 LBS | TEMPERATURE: 97.3 F | OXYGEN SATURATION: 99 % | BODY MASS INDEX: 28.65 KG/M2

## 2023-11-02 DIAGNOSIS — E78.2 MIXED DYSLIPIDEMIA: Primary | ICD-10-CM

## 2023-11-02 DIAGNOSIS — Z12.11 COLON CANCER SCREENING: ICD-10-CM

## 2023-11-02 DIAGNOSIS — E03.9 ACQUIRED HYPOTHYROIDISM: ICD-10-CM

## 2023-11-02 DIAGNOSIS — M81.0 AGE-RELATED OSTEOPOROSIS WITHOUT CURRENT PATHOLOGICAL FRACTURE: ICD-10-CM

## 2023-11-02 DIAGNOSIS — Z00.00 HEALTHCARE MAINTENANCE: ICD-10-CM

## 2023-11-02 DIAGNOSIS — Z12.31 VISIT FOR SCREENING MAMMOGRAM: ICD-10-CM

## 2023-11-02 DIAGNOSIS — K21.9 GASTRO-ESOPHAGEAL REFLUX DISEASE WITHOUT ESOPHAGITIS: ICD-10-CM

## 2023-11-02 DIAGNOSIS — D46.9 MYELODYSPLASTIC SYNDROME (CMD): ICD-10-CM

## 2023-11-02 DIAGNOSIS — H61.20 EXCESSIVE CERUMEN IN EAR CANAL, UNSPECIFIED LATERALITY: ICD-10-CM

## 2023-11-02 DIAGNOSIS — E66.3 OVERWEIGHT (BMI 25.0-29.9): ICD-10-CM

## 2023-11-02 DIAGNOSIS — F20.9 SCHIZOPHRENIA, UNSPECIFIED TYPE (CMD): ICD-10-CM

## 2023-11-02 PROCEDURE — 99214 OFFICE O/P EST MOD 30 MIN: CPT | Performed by: INTERNAL MEDICINE

## 2023-11-02 RX ORDER — FAMOTIDINE 40 MG/1
40 TABLET, FILM COATED ORAL DAILY PRN
Qty: 30 TABLET | Refills: 1 | Status: SHIPPED | OUTPATIENT
Start: 2023-11-02

## 2023-11-02 ASSESSMENT — PATIENT HEALTH QUESTIONNAIRE - PHQ9
2. FEELING DOWN, DEPRESSED OR HOPELESS: NOT AT ALL
SUM OF ALL RESPONSES TO PHQ9 QUESTIONS 1 AND 2: 0
SUM OF ALL RESPONSES TO PHQ9 QUESTIONS 1 AND 2: 0
CLINICAL INTERPRETATION OF PHQ2 SCORE: NO FURTHER SCREENING NEEDED
1. LITTLE INTEREST OR PLEASURE IN DOING THINGS: NOT AT ALL

## 2023-11-02 ASSESSMENT — PAIN SCALES - GENERAL: PAINLEVEL: 0

## 2023-11-20 ENCOUNTER — APPOINTMENT (OUTPATIENT)
Dept: INTERNAL MEDICINE | Age: 68
End: 2023-11-20

## 2023-11-20 RX ORDER — LEVOTHYROXINE SODIUM 0.1 MG/1
100 TABLET ORAL DAILY
Qty: 90 TABLET | Refills: 1 | Status: SHIPPED | OUTPATIENT
Start: 2023-11-20

## 2023-11-21 ENCOUNTER — APPOINTMENT (OUTPATIENT)
Dept: INTERNAL MEDICINE | Age: 68
End: 2023-11-21

## 2023-11-27 ENCOUNTER — APPOINTMENT (OUTPATIENT)
Dept: MAMMOGRAPHY | Age: 68
End: 2023-11-27

## 2023-11-27 RX ORDER — FAMOTIDINE 40 MG/1
40 TABLET, FILM COATED ORAL DAILY PRN
Qty: 90 TABLET | Refills: 1 | Status: SHIPPED | OUTPATIENT
Start: 2023-11-27

## 2024-01-14 ENCOUNTER — HOSPITAL ENCOUNTER (EMERGENCY)
Age: 69
End: 2024-01-14

## 2024-01-14 VITALS
SYSTOLIC BLOOD PRESSURE: 153 MMHG | TEMPERATURE: 98.1 F | HEART RATE: 74 BPM | RESPIRATION RATE: 16 BRPM | DIASTOLIC BLOOD PRESSURE: 93 MMHG | OXYGEN SATURATION: 99 %

## 2024-01-15 ENCOUNTER — OFFICE VISIT (OUTPATIENT)
Dept: URGENT CARE | Age: 69
End: 2024-01-15

## 2024-01-15 VITALS
HEIGHT: 67 IN | BODY MASS INDEX: 28.65 KG/M2 | HEART RATE: 76 BPM | SYSTOLIC BLOOD PRESSURE: 113 MMHG | OXYGEN SATURATION: 100 % | WEIGHT: 182.54 LBS | DIASTOLIC BLOOD PRESSURE: 71 MMHG | RESPIRATION RATE: 18 BRPM | TEMPERATURE: 98.5 F
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
## 2024-01-15 DIAGNOSIS — R58 ECCHYMOSIS: ICD-10-CM

## 2024-01-15 PROCEDURE — 99203 OFFICE O/P NEW LOW 30 MIN: CPT | Performed by: PEDIATRICS

## 2024-01-15 ASSESSMENT — ENCOUNTER SYMPTOMS
RESPIRATORY NEGATIVE: 1
WOUND: 0
ENDOCRINE NEGATIVE: 1
ALLERGIC/IMMUNOLOGIC NEGATIVE: 1
HEMATOLOGIC/LYMPHATIC NEGATIVE: 1
CONSTITUTIONAL NEGATIVE: 1
BACK PAIN: 1
NEUROLOGICAL NEGATIVE: 1
EYES NEGATIVE: 1
GASTROINTESTINAL NEGATIVE: 1
COLOR CHANGE: 1
PSYCHIATRIC NEGATIVE: 1

## 2024-01-28 ENCOUNTER — E-ADVICE (OUTPATIENT)
Dept: OTHER | Age: 69
End: 2024-01-28

## 2024-02-15 ENCOUNTER — E-ADVICE (OUTPATIENT)
Dept: INTERNAL MEDICINE | Age: 69
End: 2024-02-15

## 2024-02-19 ENCOUNTER — EXTERNAL RECORD (OUTPATIENT)
Dept: HEALTH INFORMATION MANAGEMENT | Facility: OTHER | Age: 69
End: 2024-02-19

## 2024-02-20 ENCOUNTER — APPOINTMENT (OUTPATIENT)
Dept: LAB | Age: 69
End: 2024-02-20

## 2024-02-20 DIAGNOSIS — E78.2 MIXED DYSLIPIDEMIA: ICD-10-CM

## 2024-02-20 DIAGNOSIS — E03.9 ACQUIRED HYPOTHYROIDISM: ICD-10-CM

## 2024-02-20 DIAGNOSIS — E66.3 OVERWEIGHT (BMI 25.0-29.9): ICD-10-CM

## 2024-02-20 DIAGNOSIS — M81.0 AGE-RELATED OSTEOPOROSIS WITHOUT CURRENT PATHOLOGICAL FRACTURE: ICD-10-CM

## 2024-02-20 DIAGNOSIS — F20.9 SCHIZOPHRENIA, UNSPECIFIED TYPE (CMD): ICD-10-CM

## 2024-02-20 DIAGNOSIS — D46.9 MYELODYSPLASTIC SYNDROME (CMD): ICD-10-CM

## 2024-02-20 DIAGNOSIS — Z12.11 COLON CANCER SCREENING: ICD-10-CM

## 2024-02-20 PROCEDURE — 80053 COMPREHEN METABOLIC PANEL: CPT | Performed by: CLINICAL MEDICAL LABORATORY

## 2024-02-20 PROCEDURE — 84443 ASSAY THYROID STIM HORMONE: CPT | Performed by: CLINICAL MEDICAL LABORATORY

## 2024-02-20 PROCEDURE — 82043 UR ALBUMIN QUANTITATIVE: CPT | Performed by: CLINICAL MEDICAL LABORATORY

## 2024-02-20 PROCEDURE — 84439 ASSAY OF FREE THYROXINE: CPT | Performed by: CLINICAL MEDICAL LABORATORY

## 2024-02-20 PROCEDURE — 36415 COLL VENOUS BLD VENIPUNCTURE: CPT | Performed by: INTERNAL MEDICINE

## 2024-02-20 PROCEDURE — 82570 ASSAY OF URINE CREATININE: CPT | Performed by: CLINICAL MEDICAL LABORATORY

## 2024-02-20 PROCEDURE — 84481 FREE ASSAY (FT-3): CPT | Performed by: CLINICAL MEDICAL LABORATORY

## 2024-02-20 PROCEDURE — 80061 LIPID PANEL: CPT | Performed by: CLINICAL MEDICAL LABORATORY

## 2024-02-20 PROCEDURE — 82274 ASSAY TEST FOR BLOOD FECAL: CPT | Performed by: CLINICAL MEDICAL LABORATORY

## 2024-02-20 ASSESSMENT — PATIENT HEALTH QUESTIONNAIRE - PHQ9
CLINICAL INTERPRETATION OF PHQ2 SCORE: 0
SUM OF ALL RESPONSES TO PHQ9 QUESTIONS 1 AND 2: 0
1. LITTLE INTEREST OR PLEASURE IN DOING THINGS: NOT AT ALL
CLINICAL INTERPRETATION OF PHQ2 SCORE: NO FURTHER SCREENING NEEDED
2. FEELING DOWN, DEPRESSED OR HOPELESS: NOT AT ALL

## 2024-02-21 LAB
ALBUMIN SERPL-MCNC: 3.6 G/DL (ref 3.6–5.1)
ALBUMIN/GLOB SERPL: 1.2 {RATIO} (ref 1–2.4)
ALP SERPL-CCNC: 89 UNITS/L (ref 45–117)
ALT SERPL-CCNC: 17 UNITS/L
ANION GAP SERPL CALC-SCNC: 9 MMOL/L (ref 7–19)
AST SERPL-CCNC: 13 UNITS/L
BILIRUB SERPL-MCNC: 0.4 MG/DL (ref 0.2–1)
BUN SERPL-MCNC: 18 MG/DL (ref 6–20)
BUN/CREAT SERPL: 25 (ref 7–25)
CALCIUM SERPL-MCNC: 9.7 MG/DL (ref 8.4–10.2)
CHLORIDE SERPL-SCNC: 108 MMOL/L (ref 97–110)
CHOLEST SERPL-MCNC: 196 MG/DL
CHOLEST/HDLC SERPL: 3.4 {RATIO}
CO2 SERPL-SCNC: 28 MMOL/L (ref 21–32)
CREAT SERPL-MCNC: 0.71 MG/DL (ref 0.51–0.95)
CREAT UR-MCNC: 54.9 MG/DL
EGFRCR SERPLBLD CKD-EPI 2021: >90 ML/MIN/{1.73_M2}
FASTING DURATION TIME PATIENT: NORMAL H
GLOBULIN SER-MCNC: 3 G/DL (ref 2–4)
GLUCOSE SERPL-MCNC: 99 MG/DL (ref 70–99)
HDLC SERPL-MCNC: 58 MG/DL
HEMOCCULT STL QL: NEGATIVE
LDLC SERPL CALC-MCNC: 126 MG/DL
MICROALBUMIN UR-MCNC: <0.5 MG/DL
MICROALBUMIN/CREAT UR: NORMAL MG/G{CREAT}
NONHDLC SERPL-MCNC: 138 MG/DL
POTASSIUM SERPL-SCNC: 4.1 MMOL/L (ref 3.4–5.1)
PROT SERPL-MCNC: 6.6 G/DL (ref 6.4–8.2)
SODIUM SERPL-SCNC: 141 MMOL/L (ref 135–145)
T3FREE SERPL-MCNC: 2.9 PG/ML (ref 2.2–4)
T4 FREE SERPL-MCNC: 1.4 NG/DL (ref 0.8–1.5)
TRIGL SERPL-MCNC: 60 MG/DL
TSH SERPL-ACNC: 0.08 MCUNITS/ML (ref 0.35–5)

## 2024-02-27 ENCOUNTER — APPOINTMENT (OUTPATIENT)
Dept: INTERNAL MEDICINE | Age: 69
End: 2024-02-27

## 2024-02-27 VITALS
DIASTOLIC BLOOD PRESSURE: 63 MMHG | RESPIRATION RATE: 17 BRPM | BODY MASS INDEX: 30.12 KG/M2 | OXYGEN SATURATION: 98 % | HEIGHT: 67 IN | WEIGHT: 191.91 LBS | TEMPERATURE: 98 F | SYSTOLIC BLOOD PRESSURE: 106 MMHG | HEART RATE: 71 BPM

## 2024-02-27 DIAGNOSIS — Z00.00 MEDICARE ANNUAL WELLNESS VISIT, SUBSEQUENT: ICD-10-CM

## 2024-02-27 DIAGNOSIS — E66.3 OVERWEIGHT (BMI 25.0-29.9): Primary | ICD-10-CM

## 2024-02-27 DIAGNOSIS — F20.9 SCHIZOPHRENIA, UNSPECIFIED TYPE (CMD): ICD-10-CM

## 2024-02-27 DIAGNOSIS — Z13.6 SCREENING FOR HEART DISEASE: ICD-10-CM

## 2024-02-27 DIAGNOSIS — Z00.00 HEALTHCARE MAINTENANCE: ICD-10-CM

## 2024-02-27 DIAGNOSIS — E78.2 MIXED DYSLIPIDEMIA: ICD-10-CM

## 2024-02-27 DIAGNOSIS — D46.9 MYELODYSPLASTIC SYNDROME (CMD): ICD-10-CM

## 2024-02-27 DIAGNOSIS — M81.0 AGE-RELATED OSTEOPOROSIS WITHOUT CURRENT PATHOLOGICAL FRACTURE: ICD-10-CM

## 2024-02-27 DIAGNOSIS — H40.009 GLAUCOMA SUSPECT, UNSPECIFIED LATERALITY: ICD-10-CM

## 2024-02-27 DIAGNOSIS — K21.9 GASTRO-ESOPHAGEAL REFLUX DISEASE WITHOUT ESOPHAGITIS: ICD-10-CM

## 2024-02-27 DIAGNOSIS — E03.9 ACQUIRED HYPOTHYROIDISM: ICD-10-CM

## 2024-02-27 DIAGNOSIS — D70.8 OTHER NEUTROPENIA (CMD): ICD-10-CM

## 2024-02-27 RX ORDER — LEVOTHYROXINE SODIUM 0.1 MG/1
100 TABLET ORAL DAILY
Qty: 90 TABLET | Refills: 1 | Status: SHIPPED | OUTPATIENT
Start: 2024-02-27

## 2024-02-27 ASSESSMENT — PATIENT HEALTH QUESTIONNAIRE - PHQ9
1. LITTLE INTEREST OR PLEASURE IN DOING THINGS: NOT AT ALL
2. FEELING DOWN, DEPRESSED OR HOPELESS: NOT AT ALL
SUM OF ALL RESPONSES TO PHQ9 QUESTIONS 1 AND 2: 0
SUM OF ALL RESPONSES TO PHQ9 QUESTIONS 1 AND 2: 0
CLINICAL INTERPRETATION OF PHQ2 SCORE: NO FURTHER SCREENING NEEDED
2. FEELING DOWN, DEPRESSED OR HOPELESS: NOT AT ALL
SUM OF ALL RESPONSES TO PHQ9 QUESTIONS 1 AND 2: 0
CLINICAL INTERPRETATION OF PHQ2 SCORE: NO FURTHER SCREENING NEEDED
SUM OF ALL RESPONSES TO PHQ9 QUESTIONS 1 AND 2: 0
1. LITTLE INTEREST OR PLEASURE IN DOING THINGS: NOT AT ALL

## 2024-02-27 ASSESSMENT — PAIN SCALES - GENERAL: PAINLEVEL: 0

## 2024-02-27 ASSESSMENT — MINI COG
PATIENT ABLE TO FILL IN THE CLOCK FACE WITH 10 MINUTES PAST 11 O'CLOCK?: YES, CLOCK IS CORRECT
TOTAL SCORE: 5
PATIENT WAS GIVEN REPEAT BACK WORDS FROM VERSION: 1 - BANANA SUNRISE CHAIR
PATIENT ABLE TO REPEAT THE 3 WORDS GIVEN PREVIOUSLY?: WAS ABLE TO REPEAT BACK 3 WORDS CORRECTLY

## 2024-03-20 ENCOUNTER — LAB SERVICES (OUTPATIENT)
Dept: LAB | Age: 69
End: 2024-03-20

## 2024-03-20 ENCOUNTER — APPOINTMENT (OUTPATIENT)
Dept: INTERNAL MEDICINE | Age: 69
End: 2024-03-20

## 2024-03-20 ENCOUNTER — APPOINTMENT (OUTPATIENT)
Dept: ENDOCRINOLOGY | Age: 69
End: 2024-03-20

## 2024-03-20 VITALS
DIASTOLIC BLOOD PRESSURE: 64 MMHG | BODY MASS INDEX: 31.07 KG/M2 | HEIGHT: 67 IN | HEART RATE: 68 BPM | SYSTOLIC BLOOD PRESSURE: 112 MMHG | WEIGHT: 197.97 LBS

## 2024-03-20 DIAGNOSIS — E03.9 ACQUIRED HYPOTHYROIDISM: ICD-10-CM

## 2024-03-20 DIAGNOSIS — E03.9 ACQUIRED HYPOTHYROIDISM: Primary | ICD-10-CM

## 2024-03-20 DIAGNOSIS — E55.9 VITAMIN D DEFICIENCY: ICD-10-CM

## 2024-03-20 DIAGNOSIS — M81.0 AGE-RELATED OSTEOPOROSIS WITHOUT CURRENT PATHOLOGICAL FRACTURE: ICD-10-CM

## 2024-03-20 LAB
25(OH)D3+25(OH)D2 SERPL-MCNC: 50.8 NG/ML (ref 30–100)
T4 FREE SERPL-MCNC: 1.4 NG/DL (ref 0.8–1.5)
TSH SERPL-ACNC: 0.04 MCUNITS/ML (ref 0.35–5)

## 2024-03-20 RX ORDER — ALENDRONATE SODIUM 70 MG/1
70 TABLET ORAL
Qty: 12 TABLET | Refills: 3 | Status: SHIPPED | OUTPATIENT
Start: 2024-03-20

## 2024-03-20 ASSESSMENT — PATIENT HEALTH QUESTIONNAIRE - PHQ9
2. FEELING DOWN, DEPRESSED OR HOPELESS: NOT AT ALL
CLINICAL INTERPRETATION OF PHQ2 SCORE: NO FURTHER SCREENING NEEDED
1. LITTLE INTEREST OR PLEASURE IN DOING THINGS: NOT AT ALL
SUM OF ALL RESPONSES TO PHQ9 QUESTIONS 1 AND 2: 0
SUM OF ALL RESPONSES TO PHQ9 QUESTIONS 1 AND 2: 0

## 2024-03-21 ENCOUNTER — IMAGING SERVICES (OUTPATIENT)
Dept: BONE DENSITY | Age: 69
End: 2024-03-21
Attending: INTERNAL MEDICINE

## 2024-03-21 DIAGNOSIS — E03.9 ACQUIRED HYPOTHYROIDISM: Primary | ICD-10-CM

## 2024-03-21 DIAGNOSIS — M81.0 AGE-RELATED OSTEOPOROSIS WITHOUT CURRENT PATHOLOGICAL FRACTURE: ICD-10-CM

## 2024-03-21 PROCEDURE — 77080 DXA BONE DENSITY AXIAL: CPT | Performed by: RADIOLOGY

## 2024-03-27 ENCOUNTER — APPOINTMENT (OUTPATIENT)
Dept: CT IMAGING | Age: 69
End: 2024-03-27
Attending: INTERNAL MEDICINE

## 2024-03-29 ENCOUNTER — LAB SERVICES (OUTPATIENT)
Dept: LAB | Age: 69
End: 2024-03-29

## 2024-03-29 DIAGNOSIS — D46.9 MYELODYSPLASTIC SYNDROME  (CMD): ICD-10-CM

## 2024-03-29 LAB
BASOPHILS # BLD: 0 K/MCL (ref 0–0.3)
BASOPHILS NFR BLD: 0 %
DEPRECATED RDW RBC: 42.5 FL (ref 39–50)
EOSINOPHIL # BLD: 0 K/MCL (ref 0–0.5)
EOSINOPHIL NFR BLD: 0 %
ERYTHROCYTE [DISTWIDTH] IN BLOOD: 13.2 % (ref 11–15)
HCT VFR BLD CALC: 40 % (ref 36–46.5)
HGB BLD-MCNC: 12.6 G/DL (ref 12–15.5)
IMM GRANULOCYTES # BLD AUTO: 0 K/MCL (ref 0–0.2)
IMM GRANULOCYTES # BLD: 0 %
LYMPHOCYTES # BLD: 1.3 K/MCL (ref 1–4)
LYMPHOCYTES NFR BLD: 56 %
MCH RBC QN AUTO: 27.8 PG (ref 26–34)
MCHC RBC AUTO-ENTMCNC: 31.5 G/DL (ref 32–36.5)
MCV RBC AUTO: 88.1 FL (ref 78–100)
MONOCYTES # BLD: 0.3 K/MCL (ref 0.3–0.9)
MONOCYTES NFR BLD: 13 %
NEUTROPHILS # BLD: 0.7 K/MCL (ref 1.8–7.7)
NEUTROPHILS NFR BLD: 31 %
NRBC BLD MANUAL-RTO: 0 /100 WBC
PLATELET # BLD AUTO: 218 K/MCL (ref 140–450)
RBC # BLD: 4.54 MIL/MCL (ref 4–5.2)
WBC # BLD: 2.3 K/MCL (ref 4.2–11)

## 2024-03-29 PROCEDURE — 36415 COLL VENOUS BLD VENIPUNCTURE: CPT | Performed by: INTERNAL MEDICINE

## 2024-03-29 PROCEDURE — 85025 COMPLETE CBC W/AUTO DIFF WBC: CPT | Performed by: CLINICAL MEDICAL LABORATORY

## 2024-04-03 ENCOUNTER — TELEPHONE (OUTPATIENT)
Dept: ENDOCRINOLOGY | Age: 69
End: 2024-04-03

## 2024-04-09 ENCOUNTER — OFFICE VISIT (OUTPATIENT)
Dept: HEMATOLOGY/ONCOLOGY | Age: 69
End: 2024-04-09
Attending: INTERNAL MEDICINE

## 2024-04-09 ENCOUNTER — E-ADVICE (OUTPATIENT)
Dept: INTERNAL MEDICINE | Age: 69
End: 2024-04-09

## 2024-04-09 VITALS
SYSTOLIC BLOOD PRESSURE: 104 MMHG | HEIGHT: 68 IN | RESPIRATION RATE: 18 BRPM | WEIGHT: 196.43 LBS | DIASTOLIC BLOOD PRESSURE: 70 MMHG | BODY MASS INDEX: 29.77 KG/M2 | OXYGEN SATURATION: 98 % | HEART RATE: 62 BPM

## 2024-04-09 DIAGNOSIS — D46.9 MYELODYSPLASTIC SYNDROME (CMD): Primary | ICD-10-CM

## 2024-04-09 PROCEDURE — 99214 OFFICE O/P EST MOD 30 MIN: CPT | Performed by: INTERNAL MEDICINE

## 2024-04-09 PROCEDURE — 99211 OFF/OP EST MAY X REQ PHY/QHP: CPT

## 2024-04-09 RX ORDER — MULTIVITAMIN WITH IRON
100 TABLET ORAL DAILY
Qty: 30 TABLET | Refills: 11 | Status: SHIPPED | OUTPATIENT
Start: 2024-04-09

## 2024-04-09 ASSESSMENT — ENCOUNTER SYMPTOMS
PSYCHIATRIC NEGATIVE: 1
GASTROINTESTINAL NEGATIVE: 1
EYES NEGATIVE: 1
ENDOCRINE NEGATIVE: 1
ALLERGIC/IMMUNOLOGIC NEGATIVE: 1
HEMATOLOGIC/LYMPHATIC NEGATIVE: 1
RESPIRATORY NEGATIVE: 1
NEUROLOGICAL NEGATIVE: 1
CONSTITUTIONAL NEGATIVE: 1

## 2024-04-09 ASSESSMENT — PATIENT HEALTH QUESTIONNAIRE - PHQ9
1. LITTLE INTEREST OR PLEASURE IN DOING THINGS: NOT AT ALL
SUM OF ALL RESPONSES TO PHQ9 QUESTIONS 1 AND 2: 0
2. FEELING DOWN, DEPRESSED OR HOPELESS: NOT AT ALL
CLINICAL INTERPRETATION OF PHQ2 SCORE: NO FURTHER SCREENING NEEDED
SUM OF ALL RESPONSES TO PHQ9 QUESTIONS 1 AND 2: 0

## 2024-04-09 ASSESSMENT — PAIN SCALES - GENERAL: PAINLEVEL: 0

## 2024-04-11 DIAGNOSIS — H26.9 CATARACT, UNSPECIFIED CATARACT TYPE, UNSPECIFIED LATERALITY: ICD-10-CM

## 2024-04-11 DIAGNOSIS — H40.009 GLAUCOMA SUSPECT, UNSPECIFIED LATERALITY: Primary | ICD-10-CM

## 2024-05-15 ENCOUNTER — APPOINTMENT (OUTPATIENT)
Dept: CT IMAGING | Age: 69
End: 2024-05-15
Attending: INTERNAL MEDICINE

## 2024-06-04 ENCOUNTER — TELEPHONE (OUTPATIENT)
Dept: INTERNAL MEDICINE | Age: 69
End: 2024-06-04

## 2024-06-06 ENCOUNTER — LAB SERVICES (OUTPATIENT)
Dept: LAB | Age: 69
End: 2024-06-06

## 2024-06-06 DIAGNOSIS — D46.9 MYELODYSPLASTIC SYNDROME  (CMD): ICD-10-CM

## 2024-06-06 DIAGNOSIS — E03.9 ACQUIRED HYPOTHYROIDISM: ICD-10-CM

## 2024-06-06 PROCEDURE — 36415 COLL VENOUS BLD VENIPUNCTURE: CPT | Performed by: INTERNAL MEDICINE

## 2024-06-06 PROCEDURE — 82746 ASSAY OF FOLIC ACID SERUM: CPT | Performed by: CLINICAL MEDICAL LABORATORY

## 2024-06-06 PROCEDURE — 84443 ASSAY THYROID STIM HORMONE: CPT | Performed by: CLINICAL MEDICAL LABORATORY

## 2024-06-06 PROCEDURE — 82607 VITAMIN B-12: CPT | Performed by: CLINICAL MEDICAL LABORATORY

## 2024-06-06 PROCEDURE — 85025 COMPLETE CBC W/AUTO DIFF WBC: CPT | Performed by: CLINICAL MEDICAL LABORATORY

## 2024-06-06 PROCEDURE — 84439 ASSAY OF FREE THYROXINE: CPT | Performed by: CLINICAL MEDICAL LABORATORY

## 2024-06-07 LAB
BASOPHILS # BLD: 0 K/MCL (ref 0–0.3)
BASOPHILS NFR BLD: 0 %
DEPRECATED RDW RBC: 44.3 FL (ref 39–50)
EOSINOPHIL # BLD: 0 K/MCL (ref 0–0.5)
EOSINOPHIL NFR BLD: 0 %
ERYTHROCYTE [DISTWIDTH] IN BLOOD: 13.7 % (ref 11–15)
FOLATE SERPL-MCNC: >24 NG/ML
HCT VFR BLD CALC: 38.4 % (ref 36–46.5)
HGB BLD-MCNC: 12.1 G/DL (ref 12–15.5)
IMM GRANULOCYTES # BLD AUTO: 0 K/MCL (ref 0–0.2)
IMM GRANULOCYTES # BLD: 0 %
LYMPHOCYTES # BLD: 1.1 K/MCL (ref 1–4)
LYMPHOCYTES NFR BLD: 37 %
MCH RBC QN AUTO: 28.2 PG (ref 26–34)
MCHC RBC AUTO-ENTMCNC: 31.5 G/DL (ref 32–36.5)
MCV RBC AUTO: 89.5 FL (ref 78–100)
MONOCYTES # BLD: 0.3 K/MCL (ref 0.3–0.9)
MONOCYTES NFR BLD: 9 %
NEUTROPHILS # BLD: 1.6 K/MCL (ref 1.8–7.7)
NEUTROPHILS NFR BLD: 54 %
NRBC BLD MANUAL-RTO: 0 /100 WBC
PLATELET # BLD AUTO: 198 K/MCL (ref 140–450)
RBC # BLD: 4.29 MIL/MCL (ref 4–5.2)
T4 FREE SERPL-MCNC: 1.3 NG/DL (ref 0.8–1.5)
TSH SERPL-ACNC: 0.18 MCUNITS/ML (ref 0.35–5)
VIT B12 SERPL-MCNC: 507 PG/ML (ref 211–911)
WBC # BLD: 3 K/MCL (ref 4.2–11)

## 2024-06-09 ENCOUNTER — E-ADVICE (OUTPATIENT)
Dept: ENDOCRINOLOGY | Age: 69
End: 2024-06-09

## 2024-06-10 ENCOUNTER — OFFICE VISIT (OUTPATIENT)
Dept: HEMATOLOGY/ONCOLOGY | Age: 69
End: 2024-06-10
Attending: INTERNAL MEDICINE

## 2024-06-10 VITALS
SYSTOLIC BLOOD PRESSURE: 103 MMHG | RESPIRATION RATE: 16 BRPM | HEIGHT: 68 IN | HEART RATE: 85 BPM | BODY MASS INDEX: 28.43 KG/M2 | WEIGHT: 187.61 LBS | TEMPERATURE: 98.8 F | OXYGEN SATURATION: 99 % | DIASTOLIC BLOOD PRESSURE: 64 MMHG

## 2024-06-10 DIAGNOSIS — D46.9 MYELODYSPLASTIC SYNDROME  (CMD): Primary | ICD-10-CM

## 2024-06-10 PROCEDURE — 99214 OFFICE O/P EST MOD 30 MIN: CPT | Performed by: INTERNAL MEDICINE

## 2024-06-10 RX ORDER — PYRIDOXINE HCL (VITAMIN B6) 50 MG
50 TABLET ORAL DAILY
Qty: 90 TABLET | Refills: 3 | Status: SHIPPED | OUTPATIENT
Start: 2024-06-10

## 2024-06-10 ASSESSMENT — ENCOUNTER SYMPTOMS
ENDOCRINE NEGATIVE: 1
EYES NEGATIVE: 1
NEUROLOGICAL NEGATIVE: 1
CONSTITUTIONAL NEGATIVE: 1
ALLERGIC/IMMUNOLOGIC NEGATIVE: 1
RESPIRATORY NEGATIVE: 1
GASTROINTESTINAL NEGATIVE: 1
HEMATOLOGIC/LYMPHATIC NEGATIVE: 1
PSYCHIATRIC NEGATIVE: 1

## 2024-06-10 ASSESSMENT — PATIENT HEALTH QUESTIONNAIRE - PHQ9
1. LITTLE INTEREST OR PLEASURE IN DOING THINGS: NOT AT ALL
2. FEELING DOWN, DEPRESSED OR HOPELESS: NOT AT ALL
CLINICAL INTERPRETATION OF PHQ2 SCORE: NO FURTHER SCREENING NEEDED
SUM OF ALL RESPONSES TO PHQ9 QUESTIONS 1 AND 2: 0
CLINICAL INTERPRETATION OF PHQ2 SCORE: NO FURTHER SCREENING NEEDED
2. FEELING DOWN, DEPRESSED OR HOPELESS: NOT AT ALL
SUM OF ALL RESPONSES TO PHQ9 QUESTIONS 1 AND 2: 0
1. LITTLE INTEREST OR PLEASURE IN DOING THINGS: NOT AT ALL

## 2024-06-10 ASSESSMENT — PAIN SCALES - GENERAL: PAINLEVEL: 0

## 2024-06-13 ENCOUNTER — APPOINTMENT (OUTPATIENT)
Dept: LAB | Age: 69
End: 2024-06-13

## 2024-07-15 ENCOUNTER — APPOINTMENT (OUTPATIENT)
Dept: LAB | Age: 69
End: 2024-07-15

## 2024-07-23 ENCOUNTER — APPOINTMENT (OUTPATIENT)
Dept: INTERNAL MEDICINE | Age: 69
End: 2024-07-23

## 2024-09-12 DIAGNOSIS — Z12.31 VISIT FOR SCREENING MAMMOGRAM: Primary | ICD-10-CM

## 2024-09-26 ENCOUNTER — E-ADVICE (OUTPATIENT)
Dept: INTERNAL MEDICINE | Age: 69
End: 2024-09-26

## 2024-09-27 ENCOUNTER — APPOINTMENT (OUTPATIENT)
Dept: INTERNAL MEDICINE | Age: 69
End: 2024-09-27

## 2024-10-16 ENCOUNTER — APPOINTMENT (OUTPATIENT)
Dept: INTERNAL MEDICINE | Age: 69
End: 2024-10-16

## 2024-10-17 ENCOUNTER — LAB SERVICES (OUTPATIENT)
Dept: LAB | Age: 69
End: 2024-10-17

## 2024-10-17 ENCOUNTER — OFFICE VISIT (OUTPATIENT)
Dept: HEMATOLOGY/ONCOLOGY | Age: 69
End: 2024-10-17
Attending: INTERNAL MEDICINE

## 2024-10-17 ENCOUNTER — IMAGING SERVICES (OUTPATIENT)
Dept: MAMMOGRAPHY | Age: 69
End: 2024-10-17

## 2024-10-17 ENCOUNTER — APPOINTMENT (OUTPATIENT)
Dept: INTERNAL MEDICINE | Age: 69
End: 2024-10-17

## 2024-10-17 VITALS
HEIGHT: 68 IN | SYSTOLIC BLOOD PRESSURE: 108 MMHG | DIASTOLIC BLOOD PRESSURE: 63 MMHG | WEIGHT: 195 LBS | BODY MASS INDEX: 29.55 KG/M2 | HEART RATE: 69 BPM | RESPIRATION RATE: 16 BRPM | OXYGEN SATURATION: 98 % | TEMPERATURE: 97.6 F

## 2024-10-17 VITALS
BODY MASS INDEX: 29.7 KG/M2 | HEIGHT: 68 IN | OXYGEN SATURATION: 100 % | WEIGHT: 195.99 LBS | TEMPERATURE: 97.5 F | RESPIRATION RATE: 16 BRPM | DIASTOLIC BLOOD PRESSURE: 72 MMHG | HEART RATE: 65 BPM | SYSTOLIC BLOOD PRESSURE: 111 MMHG

## 2024-10-17 DIAGNOSIS — Z00.00 HEALTHCARE MAINTENANCE: ICD-10-CM

## 2024-10-17 DIAGNOSIS — Z23 NEED FOR INFLUENZA VACCINATION: ICD-10-CM

## 2024-10-17 DIAGNOSIS — Z12.11 COLON CANCER SCREENING: ICD-10-CM

## 2024-10-17 DIAGNOSIS — D46.9 MYELODYSPLASTIC SYNDROME  (CMD): ICD-10-CM

## 2024-10-17 DIAGNOSIS — D70.8 OTHER NEUTROPENIA (CMD): ICD-10-CM

## 2024-10-17 DIAGNOSIS — F20.9 SCHIZOPHRENIA, UNSPECIFIED TYPE (CMD): ICD-10-CM

## 2024-10-17 DIAGNOSIS — M81.0 AGE-RELATED OSTEOPOROSIS WITHOUT CURRENT PATHOLOGICAL FRACTURE: ICD-10-CM

## 2024-10-17 DIAGNOSIS — E03.9 ACQUIRED HYPOTHYROIDISM: Primary | ICD-10-CM

## 2024-10-17 DIAGNOSIS — Z12.31 VISIT FOR SCREENING MAMMOGRAM: ICD-10-CM

## 2024-10-17 DIAGNOSIS — E66.9 OBESITY (BMI 30-39.9): ICD-10-CM

## 2024-10-17 DIAGNOSIS — E78.2 MIXED DYSLIPIDEMIA: ICD-10-CM

## 2024-10-17 DIAGNOSIS — K21.9 GASTRO-ESOPHAGEAL REFLUX DISEASE WITHOUT ESOPHAGITIS: ICD-10-CM

## 2024-10-17 DIAGNOSIS — D46.9 MYELODYSPLASTIC SYNDROME  (CMD): Primary | ICD-10-CM

## 2024-10-17 LAB
BASOPHILS # BLD: 0 K/MCL (ref 0–0.3)
BASOPHILS NFR BLD: 0 %
DEPRECATED RDW RBC: 44.7 FL (ref 39–50)
EOSINOPHIL # BLD: 0 K/MCL (ref 0–0.5)
EOSINOPHIL NFR BLD: 0 %
ERYTHROCYTE [DISTWIDTH] IN BLOOD: 13.6 % (ref 11–15)
HCT VFR BLD CALC: 37.1 % (ref 36–46.5)
HGB BLD-MCNC: 11.9 G/DL (ref 12–15.5)
IMM GRANULOCYTES # BLD AUTO: 0 K/MCL (ref 0–0.2)
IMM GRANULOCYTES # BLD: 0 %
LYMPHOCYTES # BLD: 1.2 K/MCL (ref 1–4)
LYMPHOCYTES NFR BLD: 45 %
MCH RBC QN AUTO: 28.7 PG (ref 26–34)
MCHC RBC AUTO-ENTMCNC: 32.1 G/DL (ref 32–36.5)
MCV RBC AUTO: 89.4 FL (ref 78–100)
MONOCYTES # BLD: 0.3 K/MCL (ref 0.3–0.9)
MONOCYTES NFR BLD: 13 %
NEUTROPHILS # BLD: 1.1 K/MCL (ref 1.8–7.7)
NEUTROPHILS NFR BLD: 42 %
NRBC BLD MANUAL-RTO: 0 /100 WBC
PLATELET # BLD AUTO: 214 K/MCL (ref 140–450)
RBC # BLD: 4.15 MIL/MCL (ref 4–5.2)
WBC # BLD: 2.7 K/MCL (ref 4.2–11)

## 2024-10-17 PROCEDURE — 99211 OFF/OP EST MAY X REQ PHY/QHP: CPT

## 2024-10-17 PROCEDURE — 85025 COMPLETE CBC W/AUTO DIFF WBC: CPT | Performed by: CLINICAL MEDICAL LABORATORY

## 2024-10-17 PROCEDURE — 36415 COLL VENOUS BLD VENIPUNCTURE: CPT | Performed by: INTERNAL MEDICINE

## 2024-10-17 PROCEDURE — 77067 SCR MAMMO BI INCL CAD: CPT | Performed by: INTERNAL MEDICINE

## 2024-10-17 PROCEDURE — 77063 BREAST TOMOSYNTHESIS BI: CPT | Performed by: INTERNAL MEDICINE

## 2024-10-17 RX ORDER — LEVOTHYROXINE SODIUM 100 UG/1
100 TABLET ORAL DAILY
Qty: 90 TABLET | Refills: 1 | Status: SHIPPED | OUTPATIENT
Start: 2024-10-17

## 2024-10-17 ASSESSMENT — ENCOUNTER SYMPTOMS
NEUROLOGICAL NEGATIVE: 1
RESPIRATORY NEGATIVE: 1
PSYCHIATRIC NEGATIVE: 1
ENDOCRINE NEGATIVE: 1
CONSTITUTIONAL NEGATIVE: 1
GASTROINTESTINAL NEGATIVE: 1
ALLERGIC/IMMUNOLOGIC NEGATIVE: 1
EYES NEGATIVE: 1
HEMATOLOGIC/LYMPHATIC NEGATIVE: 1

## 2024-10-17 ASSESSMENT — PATIENT HEALTH QUESTIONNAIRE - PHQ9
1. LITTLE INTEREST OR PLEASURE IN DOING THINGS: NOT AT ALL
SUM OF ALL RESPONSES TO PHQ9 QUESTIONS 1 AND 2: 0
CLINICAL INTERPRETATION OF PHQ2 SCORE: NO FURTHER SCREENING NEEDED
SUM OF ALL RESPONSES TO PHQ9 QUESTIONS 1 AND 2: 0
2. FEELING DOWN, DEPRESSED OR HOPELESS: NOT AT ALL
SUM OF ALL RESPONSES TO PHQ9 QUESTIONS 1 AND 2: 0
1. LITTLE INTEREST OR PLEASURE IN DOING THINGS: NOT AT ALL
CLINICAL INTERPRETATION OF PHQ2 SCORE: NO FURTHER SCREENING NEEDED
SUM OF ALL RESPONSES TO PHQ9 QUESTIONS 1 AND 2: 0
2. FEELING DOWN, DEPRESSED OR HOPELESS: NOT AT ALL

## 2024-10-17 ASSESSMENT — PAIN SCALES - GENERAL
PAINLEVEL: 0
PAINLEVEL: 0

## 2025-01-16 ENCOUNTER — APPOINTMENT (OUTPATIENT)
Dept: MAMMOGRAPHY | Age: 70
End: 2025-01-16

## 2025-01-17 ENCOUNTER — APPOINTMENT (OUTPATIENT)
Dept: LAB | Age: 70
End: 2025-01-17

## 2025-01-17 DIAGNOSIS — E03.9 ACQUIRED HYPOTHYROIDISM: ICD-10-CM

## 2025-01-17 DIAGNOSIS — K21.9 GASTRO-ESOPHAGEAL REFLUX DISEASE WITHOUT ESOPHAGITIS: ICD-10-CM

## 2025-01-17 DIAGNOSIS — F20.9 SCHIZOPHRENIA, UNSPECIFIED TYPE (CMD): ICD-10-CM

## 2025-01-17 DIAGNOSIS — E78.2 MIXED DYSLIPIDEMIA: ICD-10-CM

## 2025-01-17 DIAGNOSIS — E66.9 OBESITY (BMI 30-39.9): ICD-10-CM

## 2025-01-17 LAB
ALBUMIN SERPL-MCNC: 3.9 G/DL (ref 3.4–5)
ALBUMIN/GLOB SERPL: 1.3 {RATIO} (ref 1–2.4)
ALP SERPL-CCNC: 78 UNITS/L (ref 45–117)
ALT SERPL-CCNC: 13 UNITS/L
ANION GAP SERPL CALC-SCNC: 8 MMOL/L (ref 7–19)
AST SERPL-CCNC: 17 UNITS/L
BILIRUB SERPL-MCNC: 0.4 MG/DL (ref 0.2–1)
BUN SERPL-MCNC: 15 MG/DL (ref 6–20)
BUN/CREAT SERPL: 21 (ref 7–25)
CALCIUM SERPL-MCNC: 9.5 MG/DL (ref 8.4–10.2)
CHLORIDE SERPL-SCNC: 108 MMOL/L (ref 97–110)
CHOLEST SERPL-MCNC: 217 MG/DL
CHOLEST/HDLC SERPL: 3.2 {RATIO}
CO2 SERPL-SCNC: 27 MMOL/L (ref 21–32)
CREAT SERPL-MCNC: 0.7 MG/DL (ref 0.51–0.95)
EGFRCR SERPLBLD CKD-EPI 2021: >90 ML/MIN/{1.73_M2}
FASTING DURATION TIME PATIENT: ABNORMAL H
GLOBULIN SER-MCNC: 3.1 G/DL (ref 2–4)
GLUCOSE SERPL-MCNC: 101 MG/DL (ref 70–99)
HDLC SERPL-MCNC: 68 MG/DL
LDLC SERPL CALC-MCNC: 138 MG/DL
NONHDLC SERPL-MCNC: 149 MG/DL
POTASSIUM SERPL-SCNC: 4.4 MMOL/L (ref 3.4–5.1)
PROT SERPL-MCNC: 7 G/DL (ref 6.4–8.2)
SODIUM SERPL-SCNC: 139 MMOL/L (ref 135–145)
TRIGL SERPL-MCNC: 55 MG/DL
TSH SERPL-ACNC: 3.7 MCUNITS/ML (ref 0.35–5)

## 2025-01-17 PROCEDURE — 80053 COMPREHEN METABOLIC PANEL: CPT | Performed by: CLINICAL MEDICAL LABORATORY

## 2025-01-17 PROCEDURE — 82043 UR ALBUMIN QUANTITATIVE: CPT | Performed by: CLINICAL MEDICAL LABORATORY

## 2025-01-17 PROCEDURE — 84443 ASSAY THYROID STIM HORMONE: CPT | Performed by: CLINICAL MEDICAL LABORATORY

## 2025-01-17 PROCEDURE — 36415 COLL VENOUS BLD VENIPUNCTURE: CPT | Performed by: INTERNAL MEDICINE

## 2025-01-17 PROCEDURE — 82570 ASSAY OF URINE CREATININE: CPT | Performed by: CLINICAL MEDICAL LABORATORY

## 2025-01-17 PROCEDURE — 80061 LIPID PANEL: CPT | Performed by: CLINICAL MEDICAL LABORATORY

## 2025-01-18 LAB
CREAT UR-MCNC: 113 MG/DL
MICROALBUMIN UR-MCNC: 0.7 MG/DL
MICROALBUMIN/CREAT UR: 6.2 MG/G

## 2025-02-12 ENCOUNTER — APPOINTMENT (OUTPATIENT)
Dept: LAB | Age: 70
End: 2025-02-12

## 2025-02-12 DIAGNOSIS — D46.9 MYELODYSPLASTIC SYNDROME  (CMD): ICD-10-CM

## 2025-02-12 LAB
BASOPHILS # BLD: 0 K/MCL (ref 0–0.3)
BASOPHILS NFR BLD: 0 %
DEPRECATED RDW RBC: 42.6 FL (ref 39–50)
EOSINOPHIL # BLD: 0 K/MCL (ref 0–0.5)
EOSINOPHIL NFR BLD: 0 %
ERYTHROCYTE [DISTWIDTH] IN BLOOD: 13.1 % (ref 11–15)
HCT VFR BLD CALC: 38.1 % (ref 36–46.5)
HGB BLD-MCNC: 12.3 G/DL (ref 12–15.5)
IMM GRANULOCYTES # BLD AUTO: 0 K/MCL (ref 0–0.2)
IMM GRANULOCYTES # BLD: 0 %
LYMPHOCYTES # BLD: 1.2 K/MCL (ref 1–4)
LYMPHOCYTES NFR BLD: 40 %
MCH RBC QN AUTO: 29.2 PG (ref 26–34)
MCHC RBC AUTO-ENTMCNC: 32.3 G/DL (ref 32–36.5)
MCV RBC AUTO: 90.5 FL (ref 78–100)
MONOCYTES # BLD: 0.4 K/MCL (ref 0.3–0.9)
MONOCYTES NFR BLD: 12 %
NEUTROPHILS # BLD: 1.5 K/MCL (ref 1.8–7.7)
NEUTROPHILS NFR BLD: 48 %
NRBC BLD MANUAL-RTO: 0 /100 WBC
PLATELET # BLD AUTO: 227 K/MCL (ref 140–450)
RBC # BLD: 4.21 MIL/MCL (ref 4–5.2)
WBC # BLD: 3.1 K/MCL (ref 4.2–11)

## 2025-02-12 PROCEDURE — 36415 COLL VENOUS BLD VENIPUNCTURE: CPT | Performed by: INTERNAL MEDICINE

## 2025-02-12 PROCEDURE — 85025 COMPLETE CBC W/AUTO DIFF WBC: CPT | Performed by: CLINICAL MEDICAL LABORATORY

## 2025-02-13 ASSESSMENT — PATIENT HEALTH QUESTIONNAIRE - PHQ9
SUM OF ALL RESPONSES TO PHQ9 QUESTIONS 1 AND 2: 0
CLINICAL INTERPRETATION OF PHQ2 SCORE: 0
CLINICAL INTERPRETATION OF PHQ2 SCORE: NO FURTHER SCREENING NEEDED
1. LITTLE INTEREST OR PLEASURE IN DOING THINGS: NOT AT ALL
2. FEELING DOWN, DEPRESSED OR HOPELESS: NOT AT ALL

## 2025-02-20 ENCOUNTER — APPOINTMENT (OUTPATIENT)
Dept: INTERNAL MEDICINE | Age: 70
End: 2025-02-20

## 2025-02-20 ENCOUNTER — OFFICE VISIT (OUTPATIENT)
Dept: HEMATOLOGY/ONCOLOGY | Age: 70
End: 2025-02-20
Attending: INTERNAL MEDICINE

## 2025-02-20 VITALS
OXYGEN SATURATION: 97 % | HEIGHT: 68 IN | DIASTOLIC BLOOD PRESSURE: 71 MMHG | WEIGHT: 192.68 LBS | BODY MASS INDEX: 29.2 KG/M2 | TEMPERATURE: 98.1 F | RESPIRATION RATE: 16 BRPM | HEART RATE: 62 BPM | SYSTOLIC BLOOD PRESSURE: 110 MMHG

## 2025-02-20 VITALS
WEIGHT: 192.13 LBS | HEIGHT: 68 IN | OXYGEN SATURATION: 100 % | SYSTOLIC BLOOD PRESSURE: 112 MMHG | BODY MASS INDEX: 29.12 KG/M2 | TEMPERATURE: 98.2 F | RESPIRATION RATE: 18 BRPM | DIASTOLIC BLOOD PRESSURE: 80 MMHG | HEART RATE: 68 BPM

## 2025-02-20 DIAGNOSIS — H26.9 CATARACT, UNSPECIFIED CATARACT TYPE, UNSPECIFIED LATERALITY: ICD-10-CM

## 2025-02-20 DIAGNOSIS — Z12.31 VISIT FOR SCREENING MAMMOGRAM: ICD-10-CM

## 2025-02-20 DIAGNOSIS — Z00.00 HEALTHCARE MAINTENANCE: ICD-10-CM

## 2025-02-20 DIAGNOSIS — E66.3 OVERWEIGHT (BMI 25.0-29.9): ICD-10-CM

## 2025-02-20 DIAGNOSIS — D46.9 MYELODYSPLASTIC SYNDROME  (CMD): Primary | ICD-10-CM

## 2025-02-20 DIAGNOSIS — F20.9 SCHIZOPHRENIA, UNSPECIFIED TYPE (CMD): ICD-10-CM

## 2025-02-20 DIAGNOSIS — H61.20 EXCESSIVE CERUMEN IN EAR CANAL, UNSPECIFIED LATERALITY: ICD-10-CM

## 2025-02-20 DIAGNOSIS — E78.2 MIXED DYSLIPIDEMIA: Primary | ICD-10-CM

## 2025-02-20 DIAGNOSIS — Z13.6 SCREENING FOR HEART DISEASE: ICD-10-CM

## 2025-02-20 DIAGNOSIS — M81.0 AGE-RELATED OSTEOPOROSIS WITHOUT CURRENT PATHOLOGICAL FRACTURE: ICD-10-CM

## 2025-02-20 DIAGNOSIS — K21.9 GASTRO-ESOPHAGEAL REFLUX DISEASE WITHOUT ESOPHAGITIS: ICD-10-CM

## 2025-02-20 DIAGNOSIS — H40.009 GLAUCOMA SUSPECT, UNSPECIFIED LATERALITY: ICD-10-CM

## 2025-02-20 DIAGNOSIS — Z00.00 MEDICARE ANNUAL WELLNESS VISIT, SUBSEQUENT: ICD-10-CM

## 2025-02-20 DIAGNOSIS — D46.9 MYELODYSPLASTIC SYNDROME  (CMD): ICD-10-CM

## 2025-02-20 DIAGNOSIS — E03.9 ACQUIRED HYPOTHYROIDISM: ICD-10-CM

## 2025-02-20 PROCEDURE — 99211 OFF/OP EST MAY X REQ PHY/QHP: CPT

## 2025-02-20 RX ORDER — ALENDRONATE SODIUM 70 MG/1
70 TABLET ORAL
Qty: 12 TABLET | Refills: 3 | Status: SHIPPED | OUTPATIENT
Start: 2025-02-20

## 2025-02-20 RX ORDER — LEVOTHYROXINE SODIUM 100 UG/1
100 TABLET ORAL DAILY
Qty: 90 TABLET | Refills: 1 | Status: SHIPPED | OUTPATIENT
Start: 2025-02-20

## 2025-02-20 ASSESSMENT — PATIENT HEALTH QUESTIONNAIRE - PHQ9
CLINICAL INTERPRETATION OF PHQ2 SCORE: NO FURTHER SCREENING NEEDED
SUM OF ALL RESPONSES TO PHQ9 QUESTIONS 1 AND 2: 0
1. LITTLE INTEREST OR PLEASURE IN DOING THINGS: NOT AT ALL
1. LITTLE INTEREST OR PLEASURE IN DOING THINGS: NOT AT ALL
SUM OF ALL RESPONSES TO PHQ9 QUESTIONS 1 AND 2: 0
CLINICAL INTERPRETATION OF PHQ2 SCORE: NO FURTHER SCREENING NEEDED
1. LITTLE INTEREST OR PLEASURE IN DOING THINGS: NOT AT ALL
CLINICAL INTERPRETATION OF PHQ2 SCORE: NO FURTHER SCREENING NEEDED
2. FEELING DOWN, DEPRESSED OR HOPELESS: NOT AT ALL

## 2025-02-20 ASSESSMENT — COGNITIVE AND FUNCTIONAL STATUS - GENERAL
DO YOU HAVE SERIOUS DIFFICULTY WALKING OR CLIMBING STAIRS: NO
BECAUSE OF A PHYSICAL, MENTAL, OR EMOTIONAL CONDITION, DO YOU HAVE DIFFICULTY DOING ERRANDS ALONE: NO
DO YOU HAVE DIFFICULTY DRESSING OR BATHING: NO
BECAUSE OF A PHYSICAL, MENTAL, OR EMOTIONAL CONDITION, DO YOU HAVE SERIOUS DIFFICULTY CONCENTRATING, REMEMBERING OR MAKING DECISIONS: NO

## 2025-02-20 ASSESSMENT — ENCOUNTER SYMPTOMS
EYES NEGATIVE: 1
PSYCHIATRIC NEGATIVE: 1
ALLERGIC/IMMUNOLOGIC NEGATIVE: 1
GASTROINTESTINAL NEGATIVE: 1
CONSTITUTIONAL NEGATIVE: 1
RESPIRATORY NEGATIVE: 1
ENDOCRINE NEGATIVE: 1
NEUROLOGICAL NEGATIVE: 1
HEMATOLOGIC/LYMPHATIC NEGATIVE: 1

## 2025-02-20 ASSESSMENT — MINI COG
TOTAL SCORE: 5
PATIENT WAS GIVEN REPEAT BACK WORDS FROM VERSION: 1 - BANANA SUNRISE CHAIR
PATIENT ABLE TO REPEAT THE 3 WORDS GIVEN PREVIOUSLY?: WAS ABLE TO REPEAT BACK 3 WORDS CORRECTLY
PATIENT ABLE TO FILL IN THE CLOCK FACE WITH 10 MINUTES PAST 11 O'CLOCK?: YES, CLOCK IS CORRECT
PATIENT WAS GIVEN REPEAT BACK WORDS FROM VERSION: 1 - BANANA SUNRISE CHAIR

## 2025-02-20 ASSESSMENT — PAIN SCALES - GENERAL
PAINLEVEL: 0
PAINLEVEL: 0

## 2025-02-25 ENCOUNTER — TELEPHONE (OUTPATIENT)
Dept: URGENT CARE | Age: 70
End: 2025-02-25

## 2025-05-02 ENCOUNTER — APPOINTMENT (OUTPATIENT)
Dept: ENDOCRINOLOGY | Age: 70
End: 2025-05-02

## 2025-05-02 ENCOUNTER — LAB SERVICES (OUTPATIENT)
Dept: LAB | Age: 70
End: 2025-05-02

## 2025-05-02 VITALS
BODY MASS INDEX: 29.54 KG/M2 | DIASTOLIC BLOOD PRESSURE: 66 MMHG | SYSTOLIC BLOOD PRESSURE: 99 MMHG | HEIGHT: 68 IN | WEIGHT: 194.89 LBS | OXYGEN SATURATION: 100 % | HEART RATE: 67 BPM

## 2025-05-02 DIAGNOSIS — E07.9 THYROID EYE DISEASE: ICD-10-CM

## 2025-05-02 DIAGNOSIS — Z79.83 LONG-TERM CURRENT USE OF BISPHOSPHONATE: ICD-10-CM

## 2025-05-02 DIAGNOSIS — Z86.39 HISTORY OF GRAVES' DISEASE: ICD-10-CM

## 2025-05-02 DIAGNOSIS — E55.9 VITAMIN D DEFICIENCY: ICD-10-CM

## 2025-05-02 DIAGNOSIS — E89.0 POSTABLATIVE HYPOTHYROIDISM: Primary | ICD-10-CM

## 2025-05-02 DIAGNOSIS — D46.9 MYELODYSPLASTIC SYNDROME  (CMD): ICD-10-CM

## 2025-05-02 DIAGNOSIS — E89.0 POSTABLATIVE HYPOTHYROIDISM: ICD-10-CM

## 2025-05-02 DIAGNOSIS — H57.89 THYROID EYE DISEASE: ICD-10-CM

## 2025-05-02 DIAGNOSIS — M81.0 AGE-RELATED OSTEOPOROSIS WITHOUT CURRENT PATHOLOGICAL FRACTURE: ICD-10-CM

## 2025-05-02 LAB
25(OH)D3+25(OH)D2 SERPL-MCNC: 33.1 NG/ML (ref 30–100)
BASOPHILS # BLD: 0 K/MCL (ref 0–0.3)
BASOPHILS NFR BLD: 0 %
CHOLEST SERPL-MCNC: 217 MG/DL
CHOLEST/HDLC SERPL: 3.2 {RATIO}
DEPRECATED RDW RBC: 43.2 FL (ref 39–50)
EOSINOPHIL # BLD: 0 K/MCL (ref 0–0.5)
EOSINOPHIL NFR BLD: 0 %
ERYTHROCYTE [DISTWIDTH] IN BLOOD: 13.2 % (ref 11–15)
HCT VFR BLD CALC: 38.1 % (ref 36–46.5)
HDLC SERPL-MCNC: 68 MG/DL
HGB BLD-MCNC: 12.1 G/DL (ref 12–15.5)
IMM GRANULOCYTES # BLD AUTO: 0 K/MCL (ref 0–0.2)
IMM GRANULOCYTES # BLD: 0 %
LDLC SERPL CALC-MCNC: 134 MG/DL
LYMPHOCYTES # BLD: 1 K/MCL (ref 1–4)
LYMPHOCYTES NFR BLD: 44 %
MCH RBC QN AUTO: 28.6 PG (ref 26–34)
MCHC RBC AUTO-ENTMCNC: 31.8 G/DL (ref 32–36.5)
MCV RBC AUTO: 90.1 FL (ref 78–100)
MONOCYTES # BLD: 0.2 K/MCL (ref 0.3–0.9)
MONOCYTES NFR BLD: 9 %
NEUTROPHILS # BLD: 1.1 K/MCL (ref 1.8–7.7)
NEUTROPHILS NFR BLD: 47 %
NONHDLC SERPL-MCNC: 149 MG/DL
NRBC BLD MANUAL-RTO: 0 /100 WBC
PLATELET # BLD AUTO: 212 K/MCL (ref 140–450)
RBC # BLD: 4.23 MIL/MCL (ref 4–5.2)
T4 FREE SERPL-MCNC: 1.1 NG/DL (ref 0.8–1.5)
TRIGL SERPL-MCNC: 75 MG/DL
TSH SERPL-ACNC: 3.73 MCUNITS/ML (ref 0.35–5)
WBC # BLD: 2.4 K/MCL (ref 4.2–11)

## 2025-05-02 PROCEDURE — 85025 COMPLETE CBC W/AUTO DIFF WBC: CPT | Performed by: CLINICAL MEDICAL LABORATORY

## 2025-05-02 PROCEDURE — 84443 ASSAY THYROID STIM HORMONE: CPT | Performed by: CLINICAL MEDICAL LABORATORY

## 2025-05-02 PROCEDURE — 36415 COLL VENOUS BLD VENIPUNCTURE: CPT | Performed by: INTERNAL MEDICINE

## 2025-05-02 PROCEDURE — 80061 LIPID PANEL: CPT | Performed by: CLINICAL MEDICAL LABORATORY

## 2025-05-02 PROCEDURE — 84439 ASSAY OF FREE THYROXINE: CPT | Performed by: CLINICAL MEDICAL LABORATORY

## 2025-05-02 PROCEDURE — 82306 VITAMIN D 25 HYDROXY: CPT | Performed by: CLINICAL MEDICAL LABORATORY

## 2025-05-03 ENCOUNTER — RESULTS FOLLOW-UP (OUTPATIENT)
Dept: ENDOCRINOLOGY | Age: 70
End: 2025-05-03

## 2025-05-06 ENCOUNTER — APPOINTMENT (OUTPATIENT)
Dept: FAMILY MEDICINE | Age: 70
End: 2025-05-06

## 2025-05-10 ENCOUNTER — E-ADVICE (OUTPATIENT)
Dept: FAMILY MEDICINE | Age: 70
End: 2025-05-10

## 2025-08-14 ENCOUNTER — APPOINTMENT (OUTPATIENT)
Dept: LAB | Age: 70
End: 2025-08-14

## 2025-08-21 ENCOUNTER — APPOINTMENT (OUTPATIENT)
Dept: INTERNAL MEDICINE | Age: 70
End: 2025-08-21

## 2025-08-21 ENCOUNTER — APPOINTMENT (OUTPATIENT)
Dept: HEMATOLOGY/ONCOLOGY | Age: 70
End: 2025-08-21
Attending: INTERNAL MEDICINE

## 2025-11-07 ENCOUNTER — APPOINTMENT (OUTPATIENT)
Dept: MAMMOGRAPHY | Age: 70
End: 2025-11-07

## 2025-11-07 ENCOUNTER — APPOINTMENT (OUTPATIENT)
Dept: ENDOCRINOLOGY | Age: 70
End: 2025-11-07